# Patient Record
Sex: MALE | Race: WHITE | NOT HISPANIC OR LATINO | Employment: OTHER | URBAN - METROPOLITAN AREA
[De-identification: names, ages, dates, MRNs, and addresses within clinical notes are randomized per-mention and may not be internally consistent; named-entity substitution may affect disease eponyms.]

---

## 2023-03-25 DIAGNOSIS — I10 ESSENTIAL (PRIMARY) HYPERTENSION: ICD-10-CM

## 2023-03-28 RX ORDER — LOSARTAN POTASSIUM 100 MG/1
100 TABLET ORAL DAILY
Qty: 1 TABLET | Refills: 0 | Status: SHIPPED | OUTPATIENT
Start: 2023-03-28 | End: 2023-04-05

## 2023-03-30 DIAGNOSIS — I10 ESSENTIAL (PRIMARY) HYPERTENSION: ICD-10-CM

## 2023-04-05 RX ORDER — LOSARTAN POTASSIUM 100 MG/1
100 TABLET ORAL DAILY
Qty: 30 TABLET | Refills: 0 | Status: SHIPPED | OUTPATIENT
Start: 2023-04-05 | End: 2023-04-06

## 2023-04-06 DIAGNOSIS — I10 ESSENTIAL (PRIMARY) HYPERTENSION: ICD-10-CM

## 2023-04-06 RX ORDER — LOSARTAN POTASSIUM 100 MG/1
100 TABLET ORAL DAILY
Qty: 90 TABLET | Refills: 0 | Status: SHIPPED | OUTPATIENT
Start: 2023-04-06 | End: 2023-04-27 | Stop reason: SDUPTHER

## 2023-04-26 PROBLEM — M25.519 SHOULDER PAIN: Status: ACTIVE | Noted: 2023-04-26

## 2023-04-26 PROBLEM — R10.84 GENERALIZED ABDOMINAL PAIN: Status: ACTIVE | Noted: 2023-04-26

## 2023-04-26 PROBLEM — R10.31 RIGHT GROIN PAIN: Status: ACTIVE | Noted: 2023-04-26

## 2023-04-26 PROBLEM — R93.89 ABNORMAL FINDING ON IMAGING: Status: ACTIVE | Noted: 2023-04-26

## 2023-04-26 PROBLEM — G56.01 CARPAL TUNNEL SYNDROME ON RIGHT: Status: ACTIVE | Noted: 2023-04-26

## 2023-04-26 PROBLEM — R10.11 POSTPRANDIAL RUQ PAIN: Status: ACTIVE | Noted: 2023-04-26

## 2023-04-26 PROBLEM — B96.89 ACUTE BACTERIAL BRONCHITIS: Status: ACTIVE | Noted: 2023-04-26

## 2023-04-26 PROBLEM — M77.9 TENDONITIS: Status: ACTIVE | Noted: 2023-04-26

## 2023-04-26 PROBLEM — M25.571 ACUTE RIGHT ANKLE PAIN: Status: ACTIVE | Noted: 2023-04-26

## 2023-04-26 PROBLEM — M99.02 SEGMENTAL AND SOMATIC DYSFUNCTION OF THORACIC REGION: Status: ACTIVE | Noted: 2023-04-26

## 2023-04-26 PROBLEM — R60.0 SALIVARY GLAND SWELLING: Status: ACTIVE | Noted: 2023-04-26

## 2023-04-26 PROBLEM — I10 HYPERTENSION: Status: ACTIVE | Noted: 2023-04-26

## 2023-04-26 PROBLEM — M99.09 SEGMENTAL AND SOMATIC DYSFUNCTION: Status: ACTIVE | Noted: 2023-04-26

## 2023-04-26 PROBLEM — M54.2 NECK PAIN: Status: ACTIVE | Noted: 2023-04-26

## 2023-04-26 PROBLEM — M48.00 SPINAL STENOSIS, MULTILEVEL: Status: ACTIVE | Noted: 2023-04-26

## 2023-04-26 PROBLEM — K44.9 HIATAL HERNIA: Status: ACTIVE | Noted: 2023-04-26

## 2023-04-26 PROBLEM — R25.2 LEG CRAMPING: Status: ACTIVE | Noted: 2023-04-26

## 2023-04-26 PROBLEM — R19.00 MASS OF ABDOMEN: Status: ACTIVE | Noted: 2023-04-26

## 2023-04-26 PROBLEM — R20.0 NUMBNESS OF RIGHT FOOT: Status: ACTIVE | Noted: 2023-04-26

## 2023-04-26 PROBLEM — G56.02 LEFT CARPAL TUNNEL SYNDROME: Status: ACTIVE | Noted: 2023-04-26

## 2023-04-26 PROBLEM — M54.17 LUMBOSACRAL RADICULOPATHY AT L5: Status: ACTIVE | Noted: 2023-04-26

## 2023-04-26 PROBLEM — J20.8 ACUTE BACTERIAL BRONCHITIS: Status: ACTIVE | Noted: 2023-04-26

## 2023-04-26 PROBLEM — R11.10 GASTRIC REGURGITATION: Status: ACTIVE | Noted: 2023-04-26

## 2023-04-26 PROBLEM — H66.90 OTITIS MEDIA, ACUTE: Status: ACTIVE | Noted: 2023-04-26

## 2023-04-26 PROBLEM — M25.50 JOINT PAIN: Status: ACTIVE | Noted: 2023-04-26

## 2023-04-26 PROBLEM — R14.0 ABDOMINAL BLOATING: Status: ACTIVE | Noted: 2023-04-26

## 2023-04-26 PROBLEM — K59.09 CHRONIC CONSTIPATION: Status: ACTIVE | Noted: 2023-04-26

## 2023-04-26 PROBLEM — M99.03 LUMBOSACRAL DYSFUNCTION: Status: ACTIVE | Noted: 2023-04-26

## 2023-04-26 PROBLEM — M54.6 CHRONIC RIGHT-SIDED THORACIC BACK PAIN: Status: ACTIVE | Noted: 2023-04-26

## 2023-04-26 PROBLEM — M99.04 SACROILIAC JOINT SOMATIC DYSFUNCTION: Status: ACTIVE | Noted: 2023-04-26

## 2023-04-26 PROBLEM — M25.551 BILATERAL HIP PAIN: Status: ACTIVE | Noted: 2023-04-26

## 2023-04-26 PROBLEM — R63.4 WEIGHT LOSS: Status: ACTIVE | Noted: 2023-04-26

## 2023-04-26 PROBLEM — R22.1 NECK SWELLING: Status: ACTIVE | Noted: 2023-04-26

## 2023-04-26 PROBLEM — K76.9 LIVER DISEASE: Status: ACTIVE | Noted: 2023-04-26

## 2023-04-26 PROBLEM — D64.9 ANEMIA: Status: ACTIVE | Noted: 2023-04-26

## 2023-04-26 PROBLEM — K63.5 COLON POLYP: Status: ACTIVE | Noted: 2023-04-26

## 2023-04-26 PROBLEM — G89.29 CHRONIC RIGHT-SIDED THORACIC BACK PAIN: Status: ACTIVE | Noted: 2023-04-26

## 2023-04-26 PROBLEM — M54.40 LUMBAGO WITH SCIATICA: Status: ACTIVE | Noted: 2023-04-26

## 2023-04-26 PROBLEM — R10.9 CHRONIC ABDOMINAL PAIN: Status: ACTIVE | Noted: 2023-04-26

## 2023-04-26 PROBLEM — G62.9 NEUROPATHY: Status: ACTIVE | Noted: 2023-04-26

## 2023-04-26 PROBLEM — M25.552 BILATERAL HIP PAIN: Status: ACTIVE | Noted: 2023-04-26

## 2023-04-26 PROBLEM — K21.9 GASTROESOPHAGEAL REFLUX DISEASE: Status: ACTIVE | Noted: 2023-04-26

## 2023-04-26 PROBLEM — M47.816 LUMBAR SPONDYLOSIS: Status: ACTIVE | Noted: 2023-04-26

## 2023-04-26 PROBLEM — M62.838 MUSCLE SPASM: Status: ACTIVE | Noted: 2023-04-26

## 2023-04-26 PROBLEM — R09.89 THROAT TIGHTNESS: Status: ACTIVE | Noted: 2023-04-26

## 2023-04-26 PROBLEM — M54.16 RIGHT LUMBAR RADICULOPATHY: Status: ACTIVE | Noted: 2023-04-26

## 2023-04-26 PROBLEM — M35.3 PMR (POLYMYALGIA RHEUMATICA) (MULTI): Status: ACTIVE | Noted: 2023-04-26

## 2023-04-26 PROBLEM — G57.10 MERALGIA PARESTHETICA: Status: ACTIVE | Noted: 2023-04-26

## 2023-04-26 PROBLEM — M54.12 RIGHT CERVICAL RADICULOPATHY: Status: ACTIVE | Noted: 2023-04-26

## 2023-04-26 PROBLEM — R17 ELEVATED BILIRUBIN: Status: ACTIVE | Noted: 2023-04-26

## 2023-04-26 PROBLEM — R22.9 SUBCUTANEOUS MASS: Status: ACTIVE | Noted: 2023-04-26

## 2023-04-26 PROBLEM — G89.29 CHRONIC ABDOMINAL PAIN: Status: ACTIVE | Noted: 2023-04-26

## 2023-04-26 PROBLEM — R07.0 THROAT DISCOMFORT: Status: ACTIVE | Noted: 2023-04-26

## 2023-04-26 PROBLEM — M47.812 CERVICAL SPONDYLOSIS WITHOUT MYELOPATHY: Status: ACTIVE | Noted: 2023-04-26

## 2023-04-26 PROBLEM — M48.02 FORAMINAL STENOSIS OF CERVICAL REGION: Status: ACTIVE | Noted: 2023-04-26

## 2023-04-26 PROBLEM — K86.2 PANCREAS CYST (HHS-HCC): Status: ACTIVE | Noted: 2023-04-26

## 2023-04-26 PROBLEM — M54.9 BACK PAIN: Status: ACTIVE | Noted: 2023-04-26
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
## 2023-04-26 PROBLEM — J30.2 SEASONAL ALLERGIES: Status: ACTIVE | Noted: 2023-04-26

## 2023-04-26 PROBLEM — M79.642 HAND PAIN, LEFT: Status: ACTIVE | Noted: 2023-04-26

## 2023-04-26 PROBLEM — M79.651 PAIN OF RIGHT THIGH: Status: ACTIVE | Noted: 2023-04-26

## 2023-04-26 PROBLEM — K22.70 BARRETT ESOPHAGUS: Status: ACTIVE | Noted: 2023-04-26

## 2023-04-26 RX ORDER — METOPROLOL SUCCINATE 50 MG/1
50 TABLET, EXTENDED RELEASE ORAL DAILY
COMMUNITY
End: 2023-04-27 | Stop reason: SDUPTHER

## 2023-04-26 RX ORDER — OMEPRAZOLE 20 MG/1
1 CAPSULE, DELAYED RELEASE ORAL 2 TIMES DAILY
COMMUNITY
Start: 2022-04-20

## 2023-04-26 RX ORDER — LOSARTAN POTASSIUM AND HYDROCHLOROTHIAZIDE 25; 100 MG/1; MG/1
1 TABLET ORAL DAILY
COMMUNITY
End: 2023-04-27

## 2023-04-27 ENCOUNTER — OFFICE VISIT (OUTPATIENT)
Dept: PRIMARY CARE | Facility: CLINIC | Age: 63
End: 2023-04-27
Payer: COMMERCIAL

## 2023-04-27 VITALS — SYSTOLIC BLOOD PRESSURE: 114 MMHG | BODY MASS INDEX: 27.2 KG/M2 | WEIGHT: 195 LBS | DIASTOLIC BLOOD PRESSURE: 80 MMHG

## 2023-04-27 DIAGNOSIS — M35.3 PMR (POLYMYALGIA RHEUMATICA) (MULTI): ICD-10-CM

## 2023-04-27 DIAGNOSIS — I10 ESSENTIAL (PRIMARY) HYPERTENSION: ICD-10-CM

## 2023-04-27 DIAGNOSIS — K86.2 PANCREATIC CYST (HHS-HCC): Primary | ICD-10-CM

## 2023-04-27 DIAGNOSIS — G56.02 LEFT CARPAL TUNNEL SYNDROME: ICD-10-CM

## 2023-04-27 DIAGNOSIS — K22.70 BARRETT'S ESOPHAGUS WITHOUT DYSPLASIA: ICD-10-CM

## 2023-04-27 DIAGNOSIS — K21.9 GASTROESOPHAGEAL REFLUX DISEASE WITHOUT ESOPHAGITIS: ICD-10-CM

## 2023-04-27 PROCEDURE — 1036F TOBACCO NON-USER: CPT | Performed by: STUDENT IN AN ORGANIZED HEALTH CARE EDUCATION/TRAINING PROGRAM

## 2023-04-27 PROCEDURE — 99213 OFFICE O/P EST LOW 20 MIN: CPT | Performed by: STUDENT IN AN ORGANIZED HEALTH CARE EDUCATION/TRAINING PROGRAM

## 2023-04-27 PROCEDURE — 3074F SYST BP LT 130 MM HG: CPT | Performed by: STUDENT IN AN ORGANIZED HEALTH CARE EDUCATION/TRAINING PROGRAM

## 2023-04-27 PROCEDURE — 3079F DIAST BP 80-89 MM HG: CPT | Performed by: STUDENT IN AN ORGANIZED HEALTH CARE EDUCATION/TRAINING PROGRAM

## 2023-04-27 RX ORDER — LOSARTAN POTASSIUM 100 MG/1
100 TABLET ORAL DAILY
Qty: 90 TABLET | Refills: 1 | Status: SHIPPED | OUTPATIENT
Start: 2023-04-27 | End: 2023-12-21

## 2023-04-27 RX ORDER — METOPROLOL SUCCINATE 50 MG/1
50 TABLET, EXTENDED RELEASE ORAL DAILY
Qty: 90 TABLET | Refills: 1 | Status: SHIPPED | OUTPATIENT
Start: 2023-04-27 | End: 2023-12-26

## 2023-04-27 ASSESSMENT — ENCOUNTER SYMPTOMS
CONSTITUTIONAL NEGATIVE: 1
PSYCHIATRIC NEGATIVE: 1
MUSCULOSKELETAL NEGATIVE: 1
GASTROINTESTINAL NEGATIVE: 1
RESPIRATORY NEGATIVE: 1
NEUROLOGICAL NEGATIVE: 1
CARDIOVASCULAR NEGATIVE: 1

## 2023-04-27 NOTE — PROGRESS NOTES
\follow up and med refill      Subjective   Patient ID: Jenaro Belcher is a 62 y.o. male.    Routine follow-up.  Patient doing overall well, is concerned about being on losartan potassium, however review of chart reveals that patient was recently transition from losartan hydrochlorothiazide to this.  He reports no side effects, overall doing well.  Tolerating medications states no additional issues or concerns.        Review of Systems   Constitutional: Negative.    HENT: Negative.     Respiratory: Negative.     Cardiovascular: Negative.    Gastrointestinal: Negative.    Musculoskeletal: Negative.    Skin: Negative.    Neurological: Negative.    Psychiatric/Behavioral: Negative.         Objective     Visit Vitals  /80   Wt 88.5 kg (195 lb)   BMI 27.20 kg/m²   Smoking Status Former   BSA 2.11 m²      Physical Exam  Vitals and nursing note reviewed.   Constitutional:       General: He is not in acute distress.     Appearance: Normal appearance.   HENT:      Mouth/Throat:      Mouth: Mucous membranes are moist.      Pharynx: Oropharynx is clear. No oropharyngeal exudate.   Eyes:      Extraocular Movements: Extraocular movements intact.      Pupils: Pupils are equal, round, and reactive to light.   Cardiovascular:      Rate and Rhythm: Normal rate and regular rhythm.      Pulses: Normal pulses.      Heart sounds: Normal heart sounds. No murmur heard.  Pulmonary:      Effort: Pulmonary effort is normal. No respiratory distress.   Abdominal:      General: Abdomen is flat. Bowel sounds are normal. There is no distension.      Tenderness: There is no abdominal tenderness.   Musculoskeletal:         General: Normal range of motion.      Right lower leg: No edema.      Left lower leg: No edema.   Skin:     General: Skin is warm and dry.      Capillary Refill: Capillary refill takes less than 2 seconds.   Neurological:      General: No focal deficit present.      Mental Status: He is alert. Mental status is at baseline.       Cranial Nerves: No cranial nerve deficit.      Motor: No weakness.   Psychiatric:         Mood and Affect: Mood normal.         Thought Content: Thought content normal.         Assessment/Plan   Diagnoses and all orders for this visit:  Essential (primary) hypertension  -     losartan (Cozaar) 100 mg tablet; Take 1 tablet (100 mg) by mouth once daily.  -     metoprolol succinate XL (Toprol-XL) 50 mg 24 hr tablet; Take 1 tablet (50 mg) by mouth once daily.      Patient presents for routine follow-up and other complaints     #Upper respiratory infection  #Otitis media  Stable at this time, it does still have some earwax, advised Debrox as needed     #Leg cramps  #Hypertension  Leg cramps have completely resolved after switching off of losartan hydrochlorothiazide, continue losartan, metoprolol, labs at appropriate time     #Pickett's esophagus  Continue PPI twice daily    #Back pain  #Degenerative disc disease  Has followed with physical therapy with regards to this, did see neurosurgery with regards to this, he was cleared for therapy, monitor     #Bilateral carpal tunnel syndrome  Follows with orthopedic underwent surgical fixation, still having issues with the right side monitor     #abdominal cramping  #pancreatic cysts  Follows with GI as well as genetics due to family history of pancreatic cancer, follow-up on routine testing, overall stable has a planned MRI in the future    #PMR  Follows with rheumatology     #Health maintenance  Routine labs next visit      Recently had colonoscopy overall stable, advised age-appropriate vaccinations     Return to care in 6 months or prn     This note was dictated by speech recognition. Minor errors in transcription may be present.

## 2023-10-11 ENCOUNTER — HOSPITAL ENCOUNTER (OUTPATIENT)
Dept: RADIOLOGY | Facility: HOSPITAL | Age: 63
Discharge: HOME | End: 2023-10-11
Payer: COMMERCIAL

## 2023-10-11 DIAGNOSIS — K86.2 CYST OF PANCREAS (HHS-HCC): ICD-10-CM

## 2023-10-11 PROCEDURE — 74183 MRI ABD W/O CNTR FLWD CNTR: CPT

## 2023-10-11 PROCEDURE — A9575 INJ GADOTERATE MEGLUMI 0.1ML: HCPCS | Performed by: INTERNAL MEDICINE

## 2023-10-11 PROCEDURE — 2550000001 HC RX 255 CONTRASTS: Performed by: INTERNAL MEDICINE

## 2023-10-11 PROCEDURE — 76376 3D RENDER W/INTRP POSTPROCES: CPT | Performed by: RADIOLOGY

## 2023-10-11 PROCEDURE — 74183 MRI ABD W/O CNTR FLWD CNTR: CPT | Performed by: RADIOLOGY

## 2023-10-11 RX ORDER — GADOTERATE MEGLUMINE 376.9 MG/ML
17 INJECTION INTRAVENOUS
Status: COMPLETED | OUTPATIENT
Start: 2023-10-11 | End: 2023-10-11

## 2023-10-11 RX ADMIN — GADOTERATE MEGLUMINE 17 ML: 376.9 INJECTION INTRAVENOUS at 18:48

## 2023-10-19 ENCOUNTER — OFFICE VISIT (OUTPATIENT)
Dept: PRIMARY CARE | Facility: CLINIC | Age: 63
End: 2023-10-19
Payer: COMMERCIAL

## 2023-10-19 VITALS
DIASTOLIC BLOOD PRESSURE: 88 MMHG | SYSTOLIC BLOOD PRESSURE: 138 MMHG | HEIGHT: 70 IN | WEIGHT: 195 LBS | HEART RATE: 58 BPM | OXYGEN SATURATION: 98 % | BODY MASS INDEX: 27.92 KG/M2

## 2023-10-19 DIAGNOSIS — L23.7 POISON IVY: ICD-10-CM

## 2023-10-19 DIAGNOSIS — M35.3 PMR (POLYMYALGIA RHEUMATICA) (MULTI): ICD-10-CM

## 2023-10-19 DIAGNOSIS — Z00.00 HEALTHCARE MAINTENANCE: Primary | ICD-10-CM

## 2023-10-19 PROCEDURE — 3079F DIAST BP 80-89 MM HG: CPT | Performed by: INTERNAL MEDICINE

## 2023-10-19 PROCEDURE — 1036F TOBACCO NON-USER: CPT | Performed by: INTERNAL MEDICINE

## 2023-10-19 PROCEDURE — 99214 OFFICE O/P EST MOD 30 MIN: CPT | Performed by: INTERNAL MEDICINE

## 2023-10-19 PROCEDURE — 3075F SYST BP GE 130 - 139MM HG: CPT | Performed by: INTERNAL MEDICINE

## 2023-10-19 RX ORDER — METHYLPREDNISOLONE 4 MG/1
TABLET ORAL
Qty: 21 TABLET | Refills: 2 | Status: SHIPPED | OUTPATIENT
Start: 2023-10-19 | End: 2023-10-26

## 2023-10-19 RX ORDER — BETAMETHASONE DIPROPIONATE 0.5 MG/G
LOTION TOPICAL 2 TIMES DAILY PRN
Qty: 60 ML | Refills: 2 | Status: SHIPPED | OUTPATIENT
Start: 2023-10-19 | End: 2024-10-18

## 2023-10-19 NOTE — PROGRESS NOTES
"Subjective   Patient ID: Jenaro Belcher is a 63 y.o. male who presents for Poison Ivy (Both arm and ear lobes).  HPI        Past medical history chronic lumbar back pain lumbar radiculopathy PM&R hypertension    Main issue is a skin rash that is red that started over the last week or so on his arms on his ear grade 7 out of 10 its worse after he is out in the yard cleaning some bushes he is concerned about poison ivy he has had a before  Very pruritic  Insomnia from the itching  Denies fever chills pain chest pain dyspnea wheezing tongue pain swelling tongue swelling        Health Maintenance:      Colonoscopy: 2022      Mammogram:      Pelvic/Pap:      Low dose chest CT:      Aorta duplex:      Optho:      Podiatry:        Vaccines:      Prevnar 20:      Prevnar 13:      Pneumovax 23:      Tdap:      Shingrix:      COVID:      Influenza:        ROS:      General: denies fever/chills/weight loss      Head: denies HA/trauma/masses/dizziness      Eyes: denies vision change/loss of vision/blurry vision/diplopia/eye pain      Ears: denies hearing loss/tinnitus/otalgia/otorrhea      Nose: denies nasal drainage/anosmia      Throat: denies dysphagia/odynophagia      Lymphatics: denies lymph node swelling      Cardiac: denies CP/palpitations/orthopnea/PND      Pulmonary: denies dyspnea/cough/wheezing      GI: denies abd pain/n/v/diarrhea/melena/hematochezia/hematemesis      : denies dysuria/hematuria/change frequency      Genital: denies genital discharge/lesions      Skin: Severe skin rash very pruritic arms face denies rashes/lesions/masses      MSK: denies weakness/swelling/edema/gait imbalance/pain      Neuro: denies paresthesias/seizures/dysarthria      Psych: denies depression/anxiety/suicidal or homicidal ideations            Objective   BP (!) 150/98   Ht 1.778 m (5' 10\")   Wt 88.5 kg (195 lb)   BMI 27.98 kg/m²      Physical Exam:     General: AO3, NAD     Head: atraumatic/NC     Eyes: EOMI/PERRLA. Negative " "APD     Ears: TM pearly gray, EAC clear. No lesions or erythema     Nose: symmetric nares, no discharge     Throat: trachea midline, uvula midline pink mucosa. No thyromegaly     Lymphatics: no cervical/supraclavicular/ant or posterior cervical adenopathy/axillary/inguinal adenopathy     Breast: not examined     Chest: no deformity or tenderness to palpation     Pulm: CTA b/l, no wheeze/rhonchi/rales. nonlabored     Cardiac: RRR +s1s2, no m/r/g.      GI: soft, NT/ND. Normoactive Bsx4. No rebound/guarding.     Rectal: no examined     MSK: 5/5 strength UE LE. No edema/clubbing/cyanosis     Skin: Erythema and dry microvesicles noted flexor forearm erythema dryness outer ear no rashes/lesions     Vascular: 2+ palp DP PT radials b/l. Negative carotid bruit     Neuro: CNII-XII intact. No focal deficits. Reflexes 2/4 brachioradialis bicep tricep patellar achilles. Finger to nose intact.     Psych: appropriate mood/affect                    No results found for: \"BMPR1A\", \"CBCDIF\"      Assessment/Plan   Diagnoses and all orders for this visit:  Healthcare maintenance  -     CBC and Auto Differential; Future  -     Basic Metabolic Panel; Future  -     Hemoglobin A1C; Future  -     Lipid panel; Future  -     T4, free; Future  -     TSH; Future  -     Prostate Spec.Ag,Screen; Future  Poison ivy  -     methylPREDNISolone (Medrol Dospak) 4 mg tablets; Take as directed on package.  -     betamethasone dipropionate (Diprosone) 0.05 % lotion; Apply topically 2 times a day as needed for irritation or rash.  PMR (polymyalgia rheumatica) (CMS/HCC)    Go to the ER for any new or worsening symptoms such as spreading rash    Thank you for making appointment today Darren    Please follow-up in 6 months       Carlee Bellamy MA  "

## 2023-11-20 ENCOUNTER — APPOINTMENT (OUTPATIENT)
Dept: PRIMARY CARE | Facility: CLINIC | Age: 63
End: 2023-11-20
Payer: COMMERCIAL

## 2023-12-11 ENCOUNTER — OFFICE VISIT (OUTPATIENT)
Dept: GASTROENTEROLOGY | Facility: CLINIC | Age: 63
End: 2023-12-11
Payer: COMMERCIAL

## 2023-12-11 VITALS — HEART RATE: 67 BPM | BODY MASS INDEX: 30.21 KG/M2 | HEIGHT: 69 IN | WEIGHT: 204 LBS

## 2023-12-11 DIAGNOSIS — R12 FUNCTIONAL HEARTBURN: Primary | ICD-10-CM

## 2023-12-11 DIAGNOSIS — R11.0 CHRONIC NAUSEA: ICD-10-CM

## 2023-12-11 DIAGNOSIS — K44.9 HIATAL HERNIA: ICD-10-CM

## 2023-12-11 DIAGNOSIS — K21.9 GASTROESOPHAGEAL REFLUX DISEASE WITHOUT ESOPHAGITIS: ICD-10-CM

## 2023-12-11 PROCEDURE — 1036F TOBACCO NON-USER: CPT | Performed by: INTERNAL MEDICINE

## 2023-12-11 PROCEDURE — 99214 OFFICE O/P EST MOD 30 MIN: CPT | Performed by: INTERNAL MEDICINE

## 2023-12-11 RX ORDER — DESIPRAMINE HYDROCHLORIDE 10 MG/1
10 TABLET ORAL NIGHTLY
Qty: 30 TABLET | Refills: 1 | Status: SHIPPED | OUTPATIENT
Start: 2023-12-11 | End: 2024-01-04 | Stop reason: SDUPTHER

## 2023-12-11 RX ORDER — HYDROGEN PEROXIDE 3 %
20 SOLUTION, NON-ORAL MISCELLANEOUS
COMMUNITY

## 2023-12-11 NOTE — PROGRESS NOTES
REASON FOR VISIT: To discuss reflux, nausea    HPI:  Jenaro Belcher is a 63 y.o. male with a past medical history of hypertension and Pickett's esophagus and chronic GERD being evaluated in the office for persistent symptoms of GERD.  Has followed with Dr. Joshi in the past chronically.  Discussing possible Linx procedure for his chronic GERD symptoms.  He describes partial response to acid suppressive therapy even on high doses with the use of omeprazole 40 mg as well as esomeprazole in the past.  No dysphagia or dyne aphasia.  Most recent upper endoscopy done about 15 months ago with short segment Pickett's esophagus.  No reflux esophagitis otherwise.  Has 2 cm hiatal hernia.  He describes episodes every few weeks where he has persistent discomfort in the chest with tingling sensation that also goes out to his arms and legs.  This is not positional when this happens and can last many hours or several days.          PRIOR ENDOSCOPY  See Procedures    PAST MEDICAL HISTORY  Past Medical History:   Diagnosis Date    Personal history of other diseases of the circulatory system     History of hypertension       PAST SURGICAL HISTORY  Past Surgical History:   Procedure Laterality Date    COLONOSCOPY  02/19/2014    Complete Colonoscopy    ESOPHAGOGASTRODUODENOSCOPY  02/19/2014    Diagnostic Esophagogastroduodenoscopy    OTHER SURGICAL HISTORY  01/06/2023    Shoulder surgery    OTHER SURGICAL HISTORY  11/12/2019    Knee surgery       FAMILY HISTORY  No family history on file.    SOCIAL HISTORY  Social History     Tobacco Use    Smoking status: Former     Types: Cigarettes    Smokeless tobacco: Former   Substance Use Topics    Alcohol use: Not on file       REVIEW OF SYSTEMS  CONSTITUTIONAL: negative for fever, chills, fatigue, or unintentional weight loss,   HEENT: negative for icteric sclera, eye pain/redness, or changes in vision/hearing  RESPIRATORY: negative for cough, hemoptysis, wheezing, orthopnea, or dyspnea on  "exertion  CARDIOVASCULAR: negative for chest pain, palpitations, or syncope   GASTROINTESTINAL: as noted per HPI  GENITOURINARY: negative for dysuria, polyuria, incontinence, or hematuria  MUSCULOSKELETAL: negative for arthralgia, myalgia, or joint swelling/stiffness   INTEGUMENTARY/SKIN: negative jaundice, rash, or skin lesion  HEMATOLOGIC/LYMPHATIC: negative for prolonged bleeding, easy bruising, or swollen lymph nodes  ENDOCRINE: negative for cold/heat intolerance, polydipsia, polyuria, or goiter  NEUROLOGIC: negative for headaches, dizziness, tremor, or gait abnormality  PSYCHIATRIC: negative for anxiety, depression, personality changes, or sleep disturbances      A 10 point review of systems was completed and was otherwise negative.    ALLERGIES  Allergies   Allergen Reactions    Nitrofurantoin Macrocrystalline Hives       MEDICATIONS  Current Outpatient Medications   Medication Sig Dispense Refill    esomeprazole (NexIUM) 20 mg DR capsule Take 1 capsule (20 mg) by mouth once daily in the morning. Take before meals. Do not open capsule.      betamethasone dipropionate (Diprosone) 0.05 % lotion Apply topically 2 times a day as needed for irritation or rash. (Patient not taking: Reported on 12/11/2023) 60 mL 2    desipramine (Norpramin) 10 mg tablet Take 1 tablet (10 mg) by mouth once daily at bedtime. 30 tablet 1    losartan (Cozaar) 100 mg tablet Take 1 tablet (100 mg) by mouth once daily. 90 tablet 1    metoprolol succinate XL (Toprol-XL) 50 mg 24 hr tablet Take 1 tablet (50 mg) by mouth once daily. 90 tablet 1    omeprazole (PriLOSEC) 20 mg DR capsule Take 1 capsule (20 mg) by mouth 2 times a day.       No current facility-administered medications for this visit.       VITALS  Pulse 67   Ht 1.753 m (5' 9\")   Wt 92.5 kg (204 lb)   BMI 30.13 kg/m²      PHYSICAL EXAM  Alert and oriented in no acute distress      ASSESSMENT/ PLAN  Patient with chronic GERD as well as small hiatal hernia and short segment " nondysplastic Pickett's esophagus.  Having episodes that do not respond to acid suppressive therapy with discomfort in the chest with burning and tingling sensation.  I suspect element of functional heartburn and visceral hypersensitivity.  I did recommend he continue acid suppressive therapy with his Pickett's history.  I did suggest trial of low-dose desipramine for these episodes to see if that helps control these and his chronic nausea.  He would like to give it a try.  He will consider seeing surgeons to discuss Lynx if he does not have response with this approach.        Signature: Pat Ribeiro MD    Date: 12/11/2023  Time: 3:12 PM

## 2023-12-19 ENCOUNTER — ALLIED HEALTH (OUTPATIENT)
Dept: INTEGRATIVE MEDICINE | Facility: CLINIC | Age: 63
End: 2023-12-19
Payer: COMMERCIAL

## 2023-12-19 DIAGNOSIS — M99.01 SOMATIC DYSFUNCTION OF CERVICAL REGION: ICD-10-CM

## 2023-12-19 DIAGNOSIS — M99.03 SOMATIC DYSFUNCTION OF LUMBAR REGION: Primary | ICD-10-CM

## 2023-12-19 DIAGNOSIS — M54.17 LUMBOSACRAL RADICULOPATHY AT L5: ICD-10-CM

## 2023-12-19 DIAGNOSIS — M79.10 MYALGIA, UNSPECIFIED SITE: ICD-10-CM

## 2023-12-19 DIAGNOSIS — M99.02 SOMATIC DYSFUNCTION OF THORACIC REGION: ICD-10-CM

## 2023-12-19 DIAGNOSIS — M99.04 SACROILIAC JOINT SOMATIC DYSFUNCTION: ICD-10-CM

## 2023-12-19 PROCEDURE — 97140 MANUAL THERAPY 1/> REGIONS: CPT | Performed by: CHIROPRACTOR

## 2023-12-19 PROCEDURE — 98941 CHIROPRACT MANJ 3-4 REGIONS: CPT | Performed by: CHIROPRACTOR

## 2023-12-19 ASSESSMENT — ENCOUNTER SYMPTOMS
FEVER: 0
CONFUSION: 0
ABDOMINAL PAIN: 0
FREQUENCY: 0
JOINT SWELLING: 0
FATIGUE: 0
TROUBLE SWALLOWING: 0
CHEST TIGHTNESS: 0
CONSTIPATION: 0
DIARRHEA: 0
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
## 2023-12-21 DIAGNOSIS — I10 ESSENTIAL (PRIMARY) HYPERTENSION: ICD-10-CM

## 2023-12-21 RX ORDER — LOSARTAN POTASSIUM 100 MG/1
100 TABLET ORAL DAILY
Qty: 90 TABLET | Refills: 0 | Status: SHIPPED | OUTPATIENT
Start: 2023-12-21 | End: 2024-04-15 | Stop reason: SDUPTHER

## 2024-01-04 DIAGNOSIS — R12 FUNCTIONAL HEARTBURN: ICD-10-CM

## 2024-01-04 RX ORDER — DESIPRAMINE HYDROCHLORIDE 10 MG/1
10 TABLET ORAL NIGHTLY
Qty: 90 TABLET | Refills: 0 | Status: SHIPPED | OUTPATIENT
Start: 2024-01-04

## 2024-01-16 ENCOUNTER — ALLIED HEALTH (OUTPATIENT)
Dept: INTEGRATIVE MEDICINE | Facility: CLINIC | Age: 64
End: 2024-01-16
Payer: COMMERCIAL

## 2024-01-16 DIAGNOSIS — M99.03 SOMATIC DYSFUNCTION OF LUMBAR REGION: Primary | ICD-10-CM

## 2024-01-16 DIAGNOSIS — M79.10 MYALGIA, UNSPECIFIED SITE: ICD-10-CM

## 2024-01-16 DIAGNOSIS — M54.17 LUMBOSACRAL RADICULOPATHY AT L5: ICD-10-CM

## 2024-01-16 DIAGNOSIS — M25.551 PAIN IN RIGHT HIP: ICD-10-CM

## 2024-01-16 DIAGNOSIS — M99.01 SOMATIC DYSFUNCTION OF CERVICAL REGION: ICD-10-CM

## 2024-01-16 DIAGNOSIS — M99.04 SACROILIAC JOINT SOMATIC DYSFUNCTION: ICD-10-CM

## 2024-01-16 DIAGNOSIS — M99.02 SOMATIC DYSFUNCTION OF THORACIC REGION: ICD-10-CM

## 2024-01-16 PROCEDURE — 97112 NEUROMUSCULAR REEDUCATION: CPT | Performed by: CHIROPRACTOR

## 2024-01-16 PROCEDURE — 98941 CHIROPRACT MANJ 3-4 REGIONS: CPT | Performed by: CHIROPRACTOR

## 2024-01-16 ASSESSMENT — ENCOUNTER SYMPTOMS
DIARRHEA: 0
TROUBLE SWALLOWING: 0
JOINT SWELLING: 0
FATIGUE: 0
CONSTIPATION: 0
CHEST TIGHTNESS: 0
CONFUSION: 0
FREQUENCY: 0
ABDOMINAL PAIN: 0
FEVER: 0
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
## 2024-01-30 ENCOUNTER — ALLIED HEALTH (OUTPATIENT)
Dept: INTEGRATIVE MEDICINE | Facility: CLINIC | Age: 64
End: 2024-01-30
Payer: COMMERCIAL

## 2024-01-30 DIAGNOSIS — M54.2 CERVICALGIA: ICD-10-CM

## 2024-01-30 DIAGNOSIS — M79.10 MYALGIA, UNSPECIFIED SITE: ICD-10-CM

## 2024-01-30 DIAGNOSIS — M99.02 SOMATIC DYSFUNCTION OF THORACIC REGION: ICD-10-CM

## 2024-01-30 DIAGNOSIS — M99.04 SACROILIAC JOINT SOMATIC DYSFUNCTION: ICD-10-CM

## 2024-01-30 DIAGNOSIS — M54.17 LUMBOSACRAL RADICULOPATHY AT L5: ICD-10-CM

## 2024-01-30 DIAGNOSIS — M99.01 SOMATIC DYSFUNCTION OF CERVICAL REGION: ICD-10-CM

## 2024-01-30 DIAGNOSIS — M99.03 SOMATIC DYSFUNCTION OF LUMBAR REGION: Primary | ICD-10-CM

## 2024-01-30 DIAGNOSIS — M25.551 PAIN IN RIGHT HIP: ICD-10-CM

## 2024-01-30 PROCEDURE — 98941 CHIROPRACT MANJ 3-4 REGIONS: CPT | Performed by: CHIROPRACTOR

## 2024-01-30 PROCEDURE — 97112 NEUROMUSCULAR REEDUCATION: CPT | Performed by: CHIROPRACTOR

## 2024-01-30 ASSESSMENT — ENCOUNTER SYMPTOMS
TROUBLE SWALLOWING: 0
CONFUSION: 0
ABDOMINAL PAIN: 0
FEVER: 0
DIARRHEA: 0
JOINT SWELLING: 0
FREQUENCY: 0
FATIGUE: 0
CHEST TIGHTNESS: 0
CONSTIPATION: 0
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
## 2024-03-12 ENCOUNTER — ALLIED HEALTH (OUTPATIENT)
Dept: INTEGRATIVE MEDICINE | Facility: CLINIC | Age: 64
End: 2024-03-12
Payer: COMMERCIAL

## 2024-03-12 DIAGNOSIS — M99.04 SACROILIAC JOINT SOMATIC DYSFUNCTION: ICD-10-CM

## 2024-03-12 DIAGNOSIS — M25.551 PAIN IN RIGHT HIP: ICD-10-CM

## 2024-03-12 DIAGNOSIS — M54.17 LUMBOSACRAL RADICULOPATHY AT L5: ICD-10-CM

## 2024-03-12 DIAGNOSIS — M99.02 SOMATIC DYSFUNCTION OF THORACIC REGION: ICD-10-CM

## 2024-03-12 DIAGNOSIS — M99.03 SOMATIC DYSFUNCTION OF LUMBAR REGION: Primary | ICD-10-CM

## 2024-03-12 DIAGNOSIS — M79.10 MYALGIA, UNSPECIFIED SITE: ICD-10-CM

## 2024-03-12 DIAGNOSIS — M99.01 SOMATIC DYSFUNCTION OF CERVICAL REGION: ICD-10-CM

## 2024-03-12 DIAGNOSIS — M54.2 CERVICALGIA: ICD-10-CM

## 2024-03-12 PROCEDURE — 97112 NEUROMUSCULAR REEDUCATION: CPT | Performed by: CHIROPRACTOR

## 2024-03-12 PROCEDURE — 98941 CHIROPRACT MANJ 3-4 REGIONS: CPT | Performed by: CHIROPRACTOR

## 2024-03-12 ASSESSMENT — ENCOUNTER SYMPTOMS
TROUBLE SWALLOWING: 0
DIARRHEA: 0
FREQUENCY: 0
CONSTIPATION: 0
ABDOMINAL PAIN: 0
CONFUSION: 0
FEVER: 0
JOINT SWELLING: 0
FATIGUE: 0
CHEST TIGHTNESS: 0
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
## 2024-04-15 DIAGNOSIS — I10 ESSENTIAL (PRIMARY) HYPERTENSION: ICD-10-CM

## 2024-04-15 RX ORDER — METOPROLOL SUCCINATE 50 MG/1
50 TABLET, EXTENDED RELEASE ORAL DAILY
Qty: 30 TABLET | Refills: 0 | Status: SHIPPED | OUTPATIENT
Start: 2024-04-15 | End: 2024-05-21 | Stop reason: SDUPTHER

## 2024-04-15 RX ORDER — LOSARTAN POTASSIUM 100 MG/1
100 TABLET ORAL DAILY
Qty: 30 TABLET | Refills: 0 | Status: SHIPPED | OUTPATIENT
Start: 2024-04-15 | End: 2024-05-21 | Stop reason: SDUPTHER

## 2024-04-23 ENCOUNTER — ALLIED HEALTH (OUTPATIENT)
Dept: INTEGRATIVE MEDICINE | Facility: CLINIC | Age: 64
End: 2024-04-23
Payer: COMMERCIAL

## 2024-04-23 DIAGNOSIS — M99.04 SACROILIAC JOINT SOMATIC DYSFUNCTION: ICD-10-CM

## 2024-04-23 DIAGNOSIS — M99.03 SOMATIC DYSFUNCTION OF LUMBAR REGION: Primary | ICD-10-CM

## 2024-04-23 DIAGNOSIS — M54.17 LUMBOSACRAL RADICULOPATHY AT L5: ICD-10-CM

## 2024-04-23 DIAGNOSIS — M79.10 MYALGIA, UNSPECIFIED SITE: ICD-10-CM

## 2024-04-23 DIAGNOSIS — M99.01 SOMATIC DYSFUNCTION OF CERVICAL REGION: ICD-10-CM

## 2024-04-23 DIAGNOSIS — M54.2 CERVICALGIA: ICD-10-CM

## 2024-04-23 DIAGNOSIS — M99.02 SOMATIC DYSFUNCTION OF THORACIC REGION: ICD-10-CM

## 2024-04-23 DIAGNOSIS — M25.551 PAIN IN RIGHT HIP: ICD-10-CM

## 2024-04-23 PROCEDURE — 98941 CHIROPRACT MANJ 3-4 REGIONS: CPT | Performed by: CHIROPRACTOR

## 2024-04-23 PROCEDURE — 97112 NEUROMUSCULAR REEDUCATION: CPT | Performed by: CHIROPRACTOR

## 2024-04-23 ASSESSMENT — ENCOUNTER SYMPTOMS
DIARRHEA: 0
CONSTIPATION: 0
CONFUSION: 0
FATIGUE: 0
FREQUENCY: 0
TROUBLE SWALLOWING: 0
ABDOMINAL PAIN: 0
JOINT SWELLING: 0
CHEST TIGHTNESS: 0
FEVER: 0
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
## 2024-04-25 ENCOUNTER — HOSPITAL ENCOUNTER (OUTPATIENT)
Dept: RADIOLOGY | Facility: CLINIC | Age: 64
Discharge: HOME | End: 2024-04-25
Payer: COMMERCIAL

## 2024-04-25 DIAGNOSIS — M54.17 LUMBOSACRAL RADICULOPATHY AT L5: ICD-10-CM

## 2024-04-25 PROCEDURE — 72110 X-RAY EXAM L-2 SPINE 4/>VWS: CPT

## 2024-04-25 PROCEDURE — 72110 X-RAY EXAM L-2 SPINE 4/>VWS: CPT | Performed by: RADIOLOGY
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
## 2024-05-02 ENCOUNTER — ALLIED HEALTH (OUTPATIENT)
Dept: INTEGRATIVE MEDICINE | Facility: CLINIC | Age: 64
End: 2024-05-02
Payer: COMMERCIAL

## 2024-05-02 DIAGNOSIS — M99.02 SOMATIC DYSFUNCTION OF THORACIC REGION: ICD-10-CM

## 2024-05-02 DIAGNOSIS — M99.03 SOMATIC DYSFUNCTION OF LUMBAR REGION: ICD-10-CM

## 2024-05-02 DIAGNOSIS — M54.17 LUMBOSACRAL RADICULOPATHY AT L5: Primary | ICD-10-CM

## 2024-05-02 DIAGNOSIS — M99.04 SACROILIAC JOINT SOMATIC DYSFUNCTION: ICD-10-CM

## 2024-05-02 PROCEDURE — 98941 CHIROPRACT MANJ 3-4 REGIONS: CPT | Performed by: CHIROPRACTOR

## 2024-05-02 PROCEDURE — 97140 MANUAL THERAPY 1/> REGIONS: CPT | Performed by: CHIROPRACTOR

## 2024-05-02 ASSESSMENT — ENCOUNTER SYMPTOMS
FREQUENCY: 0
FATIGUE: 0
FEVER: 0
TROUBLE SWALLOWING: 0
CHEST TIGHTNESS: 0
CONSTIPATION: 0
DIARRHEA: 0
CONFUSION: 0
JOINT SWELLING: 0
ABDOMINAL PAIN: 0

## 2024-05-21 ENCOUNTER — OFFICE VISIT (OUTPATIENT)
Dept: PRIMARY CARE | Facility: CLINIC | Age: 64
End: 2024-05-21
Payer: COMMERCIAL

## 2024-05-21 VITALS — DIASTOLIC BLOOD PRESSURE: 90 MMHG | BODY MASS INDEX: 28.65 KG/M2 | SYSTOLIC BLOOD PRESSURE: 138 MMHG | WEIGHT: 194 LBS

## 2024-05-21 DIAGNOSIS — I10 ESSENTIAL (PRIMARY) HYPERTENSION: ICD-10-CM

## 2024-05-21 DIAGNOSIS — Z12.5 PROSTATE CANCER SCREENING: ICD-10-CM

## 2024-05-21 DIAGNOSIS — K21.9 GASTROESOPHAGEAL REFLUX DISEASE WITHOUT ESOPHAGITIS: Primary | ICD-10-CM

## 2024-05-21 DIAGNOSIS — Z77.090 ASBESTOS EXPOSURE: ICD-10-CM

## 2024-05-21 DIAGNOSIS — R05.9 COUGH, UNSPECIFIED TYPE: ICD-10-CM

## 2024-05-21 PROBLEM — M35.3 PMR (POLYMYALGIA RHEUMATICA) (MULTI): Status: RESOLVED | Noted: 2023-04-26 | Resolved: 2024-05-21

## 2024-05-21 PROCEDURE — 3075F SYST BP GE 130 - 139MM HG: CPT | Performed by: STUDENT IN AN ORGANIZED HEALTH CARE EDUCATION/TRAINING PROGRAM

## 2024-05-21 PROCEDURE — 3080F DIAST BP >= 90 MM HG: CPT | Performed by: STUDENT IN AN ORGANIZED HEALTH CARE EDUCATION/TRAINING PROGRAM

## 2024-05-21 PROCEDURE — 1036F TOBACCO NON-USER: CPT | Performed by: STUDENT IN AN ORGANIZED HEALTH CARE EDUCATION/TRAINING PROGRAM

## 2024-05-21 PROCEDURE — 99214 OFFICE O/P EST MOD 30 MIN: CPT | Performed by: STUDENT IN AN ORGANIZED HEALTH CARE EDUCATION/TRAINING PROGRAM

## 2024-05-21 RX ORDER — METOPROLOL SUCCINATE 50 MG/1
50 TABLET, EXTENDED RELEASE ORAL DAILY
Qty: 90 TABLET | Refills: 1 | Status: SHIPPED | OUTPATIENT
Start: 2024-05-21

## 2024-05-21 RX ORDER — ESOMEPRAZOLE MAGNESIUM 20 MG/1
20 TABLET, DELAYED RELEASE ORAL DAILY
Qty: 90 TABLET | Refills: 1 | Status: SHIPPED | OUTPATIENT
Start: 2024-05-21 | End: 2024-11-17

## 2024-05-21 RX ORDER — HYDROGEN PEROXIDE 3 %
20 SOLUTION, NON-ORAL MISCELLANEOUS
Qty: 90 CAPSULE | Refills: 1 | Status: CANCELLED | OUTPATIENT
Start: 2024-05-21

## 2024-05-21 RX ORDER — LOSARTAN POTASSIUM 100 MG/1
100 TABLET ORAL DAILY
Qty: 90 TABLET | Refills: 1 | Status: SHIPPED | OUTPATIENT
Start: 2024-05-21

## 2024-05-21 ASSESSMENT — ENCOUNTER SYMPTOMS
SHORTNESS OF BREATH: 0
ACTIVITY CHANGE: 0
CONSTITUTIONAL NEGATIVE: 1
ABDOMINAL PAIN: 0
NEUROLOGICAL NEGATIVE: 1
CHEST TIGHTNESS: 0
GASTROINTESTINAL NEGATIVE: 1
PALPITATIONS: 0
DIARRHEA: 0
NAUSEA: 0
RESPIRATORY NEGATIVE: 1
ARTHRALGIAS: 0
FEVER: 0
ABDOMINAL DISTENTION: 0
CARDIOVASCULAR NEGATIVE: 1
MUSCULOSKELETAL NEGATIVE: 1
AGITATION: 0
DIZZINESS: 0
PSYCHIATRIC NEGATIVE: 1

## 2024-05-21 ASSESSMENT — PATIENT HEALTH QUESTIONNAIRE - PHQ9
SUM OF ALL RESPONSES TO PHQ9 QUESTIONS 1 AND 2: 0
2. FEELING DOWN, DEPRESSED OR HOPELESS: NOT AT ALL
1. LITTLE INTEREST OR PLEASURE IN DOING THINGS: NOT AT ALL

## 2024-05-21 NOTE — PROGRESS NOTES
Follow up and med refill    Subjective   Patient ID: Jenaro Belcher is a 63 y.o. male.    Patient seen on follow-up.  States he is overall doing well, tolerating his medications.  Intermittently, does get some cough and congestion.  States that he was exposed to asbestos, previously in his job.  Otherwise, regards that he is tolerating his medications.  Is planning on getting orthopedic surgery for his knees.  Denies any additional issues at this time    Med Refill  Pertinent negatives include no abdominal pain, arthralgias, chest pain, fever or nausea.       Review of Systems   Constitutional: Negative.  Negative for activity change and fever.   HENT: Negative.     Respiratory: Negative.  Negative for chest tightness and shortness of breath.    Cardiovascular: Negative.  Negative for chest pain, palpitations and leg swelling.   Gastrointestinal: Negative.  Negative for abdominal distention, abdominal pain, diarrhea and nausea.   Musculoskeletal: Negative.  Negative for arthralgias.   Skin: Negative.    Neurological: Negative.  Negative for dizziness and syncope.   Psychiatric/Behavioral: Negative.  Negative for agitation and behavioral problems.        Objective Visit Vitals  /90   Wt 88 kg (194 lb)   BMI 28.65 kg/m²   Smoking Status Former   BSA 2.07 m²      Physical Exam  Constitutional:       General: He is not in acute distress.     Appearance: He is not ill-appearing.   Eyes:      Pupils: Pupils are equal, round, and reactive to light.   Cardiovascular:      Rate and Rhythm: Normal rate and regular rhythm.      Pulses: Normal pulses.      Heart sounds: No murmur heard.  Pulmonary:      Effort: No respiratory distress.      Breath sounds: No wheezing.   Abdominal:      General: Abdomen is flat. Bowel sounds are normal. There is no distension.   Musculoskeletal:      Right lower leg: No edema.      Left lower leg: No edema.   Skin:     General: Skin is warm and dry.   Neurological:      Mental Status: He is  alert. Mental status is at baseline.      Cranial Nerves: No cranial nerve deficit.      Motor: No weakness.   Psychiatric:         Mood and Affect: Mood normal.         Behavior: Behavior normal.         Assessment/Plan   Diagnoses and all orders for this visit:  Gastroesophageal reflux disease without esophagitis  -     esomeprazole magnesium (NexIUM 24HR) 20 mg tablet,delayed release (DR/EC); Take 20 mg by mouth once daily.  -     Lipid panel; Future  -     TSH with reflex to Free T4 if abnormal; Future  -     Hemoglobin A1C; Future  Essential (primary) hypertension  -     losartan (Cozaar) 100 mg tablet; Take 1 tablet (100 mg) by mouth once daily.  -     metoprolol succinate XL (Toprol-XL) 50 mg 24 hr tablet; Take 1 tablet (50 mg) by mouth once daily.  -     CBC; Future  -     Comprehensive Metabolic Panel; Future  Cough, unspecified type  -     XR chest 2 views; Future  Asbestos exposure  -     XR chest 2 views; Future  Prostate cancer screening  -     PSA, total and free; Future         Patient presents for routine follow-up and other complaints     #Upper respiratory infection  #Otitis media  No issues at this time    #Chronic cough  #Exposure to asbestos  Recommend chest x-ray today     #Leg cramps  #Hypertension  Continue current regimen well-controlled     #Pickett's esophagus  Continue PPI twice daily changed to tablets per patient request     #Back pain  #Degenerative disc disease  Has followed with physical therapy with regards to this, did see neurosurgery with regards to this, he was cleared for therapy, monitor     #Bilateral carpal tunnel syndrome  Follows with orthopedic underwent surgical fixation, still having issues with the right side monitor     #abdominal cramping  #pancreatic cysts  Follows with GI as well as genetics due to family history of pancreatic cancer, follow-up on routine testing, overall stable has a planned MRI in the future     #PMR  Follows with rheumatology     #Health  maintenance  Lab work today routine testing      Recently had colonoscopy overall stable, advised age-appropriate vaccinations     Return to care in 6 months or prn

## 2024-05-28 ENCOUNTER — ALLIED HEALTH (OUTPATIENT)
Dept: INTEGRATIVE MEDICINE | Facility: CLINIC | Age: 64
End: 2024-05-28
Payer: COMMERCIAL

## 2024-05-28 DIAGNOSIS — M99.04 SACROILIAC JOINT SOMATIC DYSFUNCTION: ICD-10-CM

## 2024-05-28 DIAGNOSIS — M79.10 MYALGIA, UNSPECIFIED SITE: ICD-10-CM

## 2024-05-28 DIAGNOSIS — M54.2 CERVICALGIA: ICD-10-CM

## 2024-05-28 DIAGNOSIS — M54.17 LUMBOSACRAL RADICULOPATHY AT L5: ICD-10-CM

## 2024-05-28 DIAGNOSIS — M99.01 SOMATIC DYSFUNCTION OF CERVICAL REGION: ICD-10-CM

## 2024-05-28 DIAGNOSIS — M25.551 PAIN IN RIGHT HIP: ICD-10-CM

## 2024-05-28 DIAGNOSIS — M99.02 SOMATIC DYSFUNCTION OF THORACIC REGION: ICD-10-CM

## 2024-05-28 DIAGNOSIS — M99.03 SOMATIC DYSFUNCTION OF LUMBAR REGION: Primary | ICD-10-CM

## 2024-05-28 PROCEDURE — 98941 CHIROPRACT MANJ 3-4 REGIONS: CPT | Performed by: CHIROPRACTOR

## 2024-05-28 PROCEDURE — 97112 NEUROMUSCULAR REEDUCATION: CPT | Performed by: CHIROPRACTOR

## 2024-05-28 ASSESSMENT — ENCOUNTER SYMPTOMS
FATIGUE: 0
JOINT SWELLING: 0
ABDOMINAL PAIN: 0
CONFUSION: 0
DIARRHEA: 0
FREQUENCY: 0
CONSTIPATION: 0
TROUBLE SWALLOWING: 0
FEVER: 0
CHEST TIGHTNESS: 0
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
## 2024-07-02 ENCOUNTER — APPOINTMENT (OUTPATIENT)
Dept: INTEGRATIVE MEDICINE | Facility: CLINIC | Age: 64
End: 2024-07-02
Payer: COMMERCIAL

## 2024-07-02 DIAGNOSIS — M25.551 PAIN IN RIGHT HIP: ICD-10-CM

## 2024-07-02 DIAGNOSIS — M99.02 SOMATIC DYSFUNCTION OF THORACIC REGION: ICD-10-CM

## 2024-07-02 DIAGNOSIS — M99.04 SACROILIAC JOINT SOMATIC DYSFUNCTION: ICD-10-CM

## 2024-07-02 DIAGNOSIS — M54.2 CERVICALGIA: ICD-10-CM

## 2024-07-02 DIAGNOSIS — M54.17 LUMBOSACRAL RADICULOPATHY AT L5: ICD-10-CM

## 2024-07-02 DIAGNOSIS — M99.01 SOMATIC DYSFUNCTION OF CERVICAL REGION: ICD-10-CM

## 2024-07-02 DIAGNOSIS — M99.03 SOMATIC DYSFUNCTION OF LUMBAR REGION: Primary | ICD-10-CM

## 2024-07-02 PROCEDURE — 98941 CHIROPRACT MANJ 3-4 REGIONS: CPT | Performed by: CHIROPRACTOR

## 2024-07-02 PROCEDURE — 97112 NEUROMUSCULAR REEDUCATION: CPT | Performed by: CHIROPRACTOR
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
## 2024-08-05 ENCOUNTER — HOME HEALTH ADMISSION (OUTPATIENT)
Dept: HOME HEALTH SERVICES | Facility: HOME HEALTH | Age: 64
End: 2024-08-05
Payer: COMMERCIAL

## 2024-08-05 ENCOUNTER — DOCUMENTATION (OUTPATIENT)
Dept: HOME HEALTH SERVICES | Facility: HOME HEALTH | Age: 64
End: 2024-08-05
Payer: COMMERCIAL

## 2024-08-05 NOTE — HH CARE COORDINATION
Home Care received a Referral for Physical Therapy. We have processed the referral for a Start of Care on 8/7.     If you have any questions or concerns, please feel free to contact us at [phone]. Follow the prompts, enter your five digit zip code, and you will be directed to your care team on [facility] 3.

## 2024-08-07 ENCOUNTER — HOME CARE VISIT (OUTPATIENT)
Dept: HOME HEALTH SERVICES | Facility: HOME HEALTH | Age: 64
End: 2024-08-07

## 2024-08-09 ENCOUNTER — DOCUMENTATION (OUTPATIENT)
Dept: PRIMARY CARE | Facility: CLINIC | Age: 64
End: 2024-08-09
Payer: COMMERCIAL

## 2024-08-09 ENCOUNTER — PATIENT OUTREACH (OUTPATIENT)
Dept: CARE COORDINATION | Facility: CLINIC | Age: 64
End: 2024-08-09
Payer: COMMERCIAL

## 2024-08-09 ENCOUNTER — HOME CARE VISIT (OUTPATIENT)
Dept: HOME HEALTH SERVICES | Facility: HOME HEALTH | Age: 64
End: 2024-08-09
Payer: COMMERCIAL

## 2024-08-09 VITALS
BODY MASS INDEX: 27.02 KG/M2 | DIASTOLIC BLOOD PRESSURE: 76 MMHG | WEIGHT: 193 LBS | RESPIRATION RATE: 20 BRPM | SYSTOLIC BLOOD PRESSURE: 138 MMHG | HEIGHT: 71 IN

## 2024-08-09 PROCEDURE — G0151 HHCP-SERV OF PT,EA 15 MIN: HCPCS

## 2024-08-09 RX ORDER — TRAMADOL HYDROCHLORIDE 50 MG/1
50 TABLET ORAL EVERY 6 HOURS PRN
COMMUNITY

## 2024-08-09 RX ORDER — DOCUSATE SODIUM 100 MG/1
100 CAPSULE, LIQUID FILLED ORAL 2 TIMES DAILY PRN
COMMUNITY

## 2024-08-09 SDOH — HEALTH STABILITY: PHYSICAL HEALTH: EXERCISE TYPE: TKR PROCOTOL

## 2024-08-09 SDOH — HEALTH STABILITY: PHYSICAL HEALTH: PHYSICAL EXERCISE: SUPINE

## 2024-08-09 SDOH — HEALTH STABILITY: PHYSICAL HEALTH: EXERCISE ACTIVITY: ANKLE PUMPS, QUAD AND GLUT SETS, HEEL SLIDES AAROM, SAQ

## 2024-08-09 SDOH — HEALTH STABILITY: PHYSICAL HEALTH: PHYSICAL EXERCISE: 5

## 2024-08-09 SDOH — HEALTH STABILITY: PHYSICAL HEALTH: EXERCISE ACTIVITIES SETS: 1

## 2024-08-09 ASSESSMENT — ACTIVITIES OF DAILY LIVING (ADL)
AMBULATION ASSISTANCE: 1
AMBULATION_DISTANCE/DURATION_TOLERATED: 30 FEET X2
OASIS_M1830: 05
AMBULATION ASSISTANCE: STAND BY ASSIST
ENTERING_EXITING_HOME: STAND BY ASSIST
CURRENT_FUNCTION: CONTACT GUARD ASSIST
PHYSICAL TRANSFERS ASSESSED: 1
AMBULATION ASSISTANCE ON FLAT SURFACES: 1

## 2024-08-09 ASSESSMENT — ENCOUNTER SYMPTOMS
PERSON REPORTING PAIN: PATIENT
SUBJECTIVE PAIN PROGRESSION: GRADUALLY IMPROVING
OCCASIONAL FEELINGS OF UNSTEADINESS: 1
PAIN LOCATION: RIGHT KNEE
HIGHEST PAIN SEVERITY IN PAST 24 HOURS: 10/10
LOWEST PAIN SEVERITY IN PAST 24 HOURS: 5/10
PAIN SEVERITY GOAL: 0/10
HYPERTENSION: 1
PAIN: 1

## 2024-08-09 NOTE — PROGRESS NOTES
Called patient and left a voicemail to call back and schedule her annual follow-up with Dr. Monzon.   Discharge Facility:[facility]  Discharge Diagnosis:  Carbon monoxide poisoning   Admission Date:8/7/2024  Discharge Date: 8/8/2024    PCP Appointment Date:Declined  Specialist Appointment Date:   8/15/2024 Ortho/Jennifer  Hospital Encounter and Summary Linked: Yes  See discharge assessment below for further details  Engagement  Call Start Time: 1459 (8/9/2024  2:59 PM)    Medications  Medications reviewed with patient/caregiver?: Yes (8/9/2024  2:59 PM)  Is the patient having any side effects they believe may be caused by any medication additions or changes?: No (8/9/2024  2:59 PM)  Does the patient have all medications ordered at discharge?: Yes (8/9/2024  2:59 PM)  Care Management Interventions: No intervention needed (8/9/2024  2:59 PM)  Is the patient taking all medications as directed (includes completed medication regime)?: Yes (8/9/2024  2:59 PM)    Appointments  Does the patient have a primary care provider?: Yes (8/9/2024  2:59 PM)  Care Management Interventions: -- (Declines PCP follow up at the time of outreach) (8/9/2024  2:59 PM)  Has the patient kept scheduled appointments due by today?: Yes (8/9/2024  2:59 PM)    Self Management  What is the home health agency?: -- ([facility]) (8/9/2024  2:59 PM)  Has home health visited the patient within 72 hours of discharge?: Yes (8/9/2024  2:59 PM)  What Durable Medical Equipment (DME) was ordered?: -- ([facility]) (8/9/2024  2:59 PM)    Patient Teaching  Does the patient have access to their discharge instructions?: Yes (8/9/2024  2:59 PM)  What is the patient's perception of their health status since discharge?: Improving (8/9/2024  2:59 PM)  Is the patient/caregiver able to teach back the hierarchy of who to call/visit for symptoms/problems? PCP, Specialist, Home Health nurse, Urgent Care, ED, 911: Yes (8/9/2024  2:59 PM)    Wrap Up  Wrap Up Additional Comments: -- (Jenaro states that he and family are doing better. He was doing PT at time of outreach.He will  contact provider if any concerns.) (8/9/2024  2:59 PM)

## 2024-08-12 ENCOUNTER — HOME CARE VISIT (OUTPATIENT)
Dept: HOME HEALTH SERVICES | Facility: HOME HEALTH | Age: 64
End: 2024-08-12
Payer: COMMERCIAL

## 2024-08-12 VITALS
HEART RATE: 70 BPM | OXYGEN SATURATION: 98 % | TEMPERATURE: 99.2 F | DIASTOLIC BLOOD PRESSURE: 78 MMHG | SYSTOLIC BLOOD PRESSURE: 134 MMHG | RESPIRATION RATE: 18 BRPM

## 2024-08-12 PROCEDURE — G0157 HHC PT ASSISTANT EA 15: HCPCS | Mod: CQ

## 2024-08-12 ASSESSMENT — ENCOUNTER SYMPTOMS
PAIN SEVERITY GOAL: 0/10
PAIN LOCATION: RIGHT KNEE
SUBJECTIVE PAIN PROGRESSION: GRADUALLY IMPROVING
PERSON REPORTING PAIN: PATIENT
HIGHEST PAIN SEVERITY IN PAST 24 HOURS: 8/10
PAIN LOCATION - PAIN QUALITY: THROBBING
PAIN: 1
LOWEST PAIN SEVERITY IN PAST 24 HOURS: 2/10
PAIN LOCATION - PAIN FREQUENCY: FREQUENT
PAIN LOCATION - PAIN SEVERITY: 6/10

## 2024-08-14 ENCOUNTER — HOME CARE VISIT (OUTPATIENT)
Dept: HOME HEALTH SERVICES | Facility: HOME HEALTH | Age: 64
End: 2024-08-14
Payer: COMMERCIAL

## 2024-08-14 VITALS
TEMPERATURE: 97.5 F | DIASTOLIC BLOOD PRESSURE: 88 MMHG | SYSTOLIC BLOOD PRESSURE: 144 MMHG | OXYGEN SATURATION: 100 % | RESPIRATION RATE: 18 BRPM

## 2024-08-14 PROCEDURE — G0157 HHC PT ASSISTANT EA 15: HCPCS | Mod: CQ

## 2024-08-14 ASSESSMENT — ENCOUNTER SYMPTOMS
PAIN SEVERITY GOAL: 0/10
PAIN LOCATION - PAIN SEVERITY: 5/10
PAIN LOCATION: RIGHT KNEE
PAIN: 1
SUBJECTIVE PAIN PROGRESSION: GRADUALLY IMPROVING
PERSON REPORTING PAIN: PATIENT
LOWEST PAIN SEVERITY IN PAST 24 HOURS: 2/10
PAIN LOCATION - PAIN FREQUENCY: FREQUENT
PAIN LOCATION - PAIN QUALITY: THROBBING
HIGHEST PAIN SEVERITY IN PAST 24 HOURS: 6/10

## 2024-08-16 ENCOUNTER — HOME CARE VISIT (OUTPATIENT)
Dept: HOME HEALTH SERVICES | Facility: HOME HEALTH | Age: 64
End: 2024-08-16
Payer: COMMERCIAL

## 2024-08-16 VITALS
HEART RATE: 78 BPM | OXYGEN SATURATION: 98 % | SYSTOLIC BLOOD PRESSURE: 124 MMHG | RESPIRATION RATE: 18 BRPM | TEMPERATURE: 98.9 F | DIASTOLIC BLOOD PRESSURE: 82 MMHG

## 2024-08-16 PROCEDURE — G0157 HHC PT ASSISTANT EA 15: HCPCS | Mod: CQ

## 2024-08-16 ASSESSMENT — ENCOUNTER SYMPTOMS
PAIN: 1
SUBJECTIVE PAIN PROGRESSION: GRADUALLY IMPROVING
HIGHEST PAIN SEVERITY IN PAST 24 HOURS: 7/10
PAIN SEVERITY GOAL: 0/10
PAIN LOCATION - PAIN QUALITY: THROBBING
PERSON REPORTING PAIN: PATIENT
PAIN LOCATION - PAIN SEVERITY: 5/10
PAIN LOCATION: RIGHT KNEE
LOWEST PAIN SEVERITY IN PAST 24 HOURS: 3/10
PAIN LOCATION - RELIEVING FACTORS: ICE, OTC MEDS
PAIN LOCATION - PAIN FREQUENCY: FREQUENT

## 2024-08-19 ENCOUNTER — HOME CARE VISIT (OUTPATIENT)
Dept: HOME HEALTH SERVICES | Facility: HOME HEALTH | Age: 64
End: 2024-08-19
Payer: COMMERCIAL

## 2024-08-19 VITALS
HEART RATE: 74 BPM | TEMPERATURE: 99.1 F | SYSTOLIC BLOOD PRESSURE: 140 MMHG | OXYGEN SATURATION: 95 % | RESPIRATION RATE: 18 BRPM | DIASTOLIC BLOOD PRESSURE: 78 MMHG

## 2024-08-19 PROCEDURE — G0157 HHC PT ASSISTANT EA 15: HCPCS | Mod: CQ

## 2024-08-19 ASSESSMENT — ENCOUNTER SYMPTOMS
PAIN: 1
PAIN LOCATION: RIGHT KNEE
PAIN SEVERITY GOAL: 0/10
SUBJECTIVE PAIN PROGRESSION: GRADUALLY IMPROVING
PAIN LOCATION - PAIN SEVERITY: 5/10
PAIN LOCATION - PAIN QUALITY: THROBBING
PERSON REPORTING PAIN: PATIENT
HIGHEST PAIN SEVERITY IN PAST 24 HOURS: 6/10
PAIN LOCATION - PAIN FREQUENCY: FREQUENT
LOWEST PAIN SEVERITY IN PAST 24 HOURS: 2/10

## 2024-08-21 ENCOUNTER — HOME CARE VISIT (OUTPATIENT)
Dept: HOME HEALTH SERVICES | Facility: HOME HEALTH | Age: 64
End: 2024-08-21
Payer: COMMERCIAL

## 2024-08-21 PROCEDURE — G0157 HHC PT ASSISTANT EA 15: HCPCS | Mod: CQ

## 2024-08-21 ASSESSMENT — ENCOUNTER SYMPTOMS
PAIN LOCATION - PAIN FREQUENCY: INTERMITTENT
PAIN SEVERITY GOAL: 0/10
HIGHEST PAIN SEVERITY IN PAST 24 HOURS: 6/10
PAIN: 1
PAIN LOCATION - PAIN QUALITY: SHARP
LOWEST PAIN SEVERITY IN PAST 24 HOURS: 2/10
PAIN LOCATION - PAIN SEVERITY: 5/10
SUBJECTIVE PAIN PROGRESSION: GRADUALLY IMPROVING
PAIN LOCATION - EXACERBATING FACTORS: EXTENDED ACTIVITY
PAIN LOCATION - RELIEVING FACTORS: ICE, MEDS, REST
PAIN LOCATION: RIGHT KNEE
PERSON REPORTING PAIN: PATIENT

## 2024-08-23 ENCOUNTER — PATIENT OUTREACH (OUTPATIENT)
Dept: PRIMARY CARE | Facility: CLINIC | Age: 64
End: 2024-08-23
Payer: COMMERCIAL

## 2024-08-23 NOTE — PROGRESS NOTES
Call after hospitalization.  At time of outreach call the patient feels as if their condition has improved since last visit.  Reviewed the PCP appointment with the pt and addressed any questions or concerns.  Jenaro is doing well, PT states making good progress but he is unable to take pain meds which is challenging for him.

## 2024-08-26 ENCOUNTER — HOME CARE VISIT (OUTPATIENT)
Dept: HOME HEALTH SERVICES | Facility: HOME HEALTH | Age: 64
End: 2024-08-26
Payer: COMMERCIAL

## 2024-08-27 DIAGNOSIS — K86.2 PANCREATIC CYST (HHS-HCC): Primary | ICD-10-CM

## 2024-08-29 ENCOUNTER — HOME CARE VISIT (OUTPATIENT)
Dept: HOME HEALTH SERVICES | Facility: HOME HEALTH | Age: 64
End: 2024-08-29
Payer: COMMERCIAL

## 2024-08-29 ASSESSMENT — ACTIVITIES OF DAILY LIVING (ADL)
OASIS_M1830: 02
HOME_HEALTH_OASIS: 01

## 2024-08-29 NOTE — CASE COMMUNICATION
Patient requesting discharge from PT and all [facility] services.  Patient has now gone out of state. Patient instructed in signs and symptoms of infection, when to call MD or 911, fall prevention, safe progression of WHEP, safety with household transfers and ambulation with assistive device.

## 2024-09-04 SDOH — HEALTH STABILITY: PHYSICAL HEALTH: EXERCISE ACTIVITY: STRENGTHENING, ROM AND BALANCE

## 2024-09-04 SDOH — HEALTH STABILITY: PHYSICAL HEALTH: PHYSICAL EXERCISE: 10

## 2024-09-04 SDOH — HEALTH STABILITY: PHYSICAL HEALTH: EXERCISE TYPE: WHEP

## 2024-09-04 SDOH — HEALTH STABILITY: PHYSICAL HEALTH: PHYSICAL EXERCISE: SUPINE, SITTING AND STANDING

## 2024-09-04 SDOH — HEALTH STABILITY: PHYSICAL HEALTH: EXERCISE ACTIVITIES SETS: 1

## 2024-09-04 ASSESSMENT — ENCOUNTER SYMPTOMS
HIGHEST PAIN SEVERITY IN PAST 24 HOURS: 10/10
PAIN SEVERITY GOAL: 0/10
LOWEST PAIN SEVERITY IN PAST 24 HOURS: 8/10
SUBJECTIVE PAIN PROGRESSION: UNCHANGED

## 2024-09-04 ASSESSMENT — ACTIVITIES OF DAILY LIVING (ADL)
AMBULATION ASSISTANCE ON FLAT SURFACES: 1
AMBULATION_DISTANCE/DURATION_TOLERATED: 100 FEET
HOME_HEALTH_OASIS: 01
OASIS_M1830: 02

## 2024-09-04 NOTE — CASE COMMUNICATION
Spoke to patients wife on phone [phone].  Patient is having continued severe pain mostly at all times, she said and he is not able to take any of the pain meds prescribed.  They are in [place] at this time and are not planning to return in near future.  CG instructed in signs and symptoms of infection, when to call MD or 911, fall prevention, safe progression of WHEP, safety with household transfers and ambulation with assistive device.

## 2024-09-04 NOTE — PROGRESS NOTES
Physical Therapy    Physical Therapy Evaluation and Treatment      Patient Name: Jenaro Belcher  MRN: [id number]  Today's Date: 9/5/2024  Time Calculation  Start Time: 1455  Stop Time: 1527  Time Calculation (min): 32 min    Insurance - reviewed   Visit number: 1 (updated 09/05/24)   Payor:  EMPLOYEE MEDICAL PLAN / Plan:  EMPLOYEE MEDICAL PLAN CONSUMER [employer] / Product Type: *No Product type* /    30 PT/OT V PCY 12 USED NEEDS AUTH CONTIGO PAYS % OOP MET   Referring Provider: Redd Oconnor MD   DOS: R TKR on 8/25/24     Assessment:  Jenaro Belcher  is a 64 y.o. old patient who participated in a physical therapy evaluation today s/p R TKR on 8/25/24. Experiencing new onset of L knee and low back pain post-op: believes he has a leg length discrepancy, will ask surgeon at next appointment. Jenaro's impairments include: gait deficits, pain, decreased strength, and decreased range of motion.   Due to these impairments, he has the following functional limitations and participation restrictions: difficulty performing recreational activities and difficulty performing occupational activities. Jenaro's clinical presentation is stable and/or uncomplicated characteristics with the level of complexity being low. Jenaro's potential for rehab is good.  Skilled physical therapy services are appropriate and beneficial in order to achieve measurable and meaningful change in the objective tests and measures. Utilization of skilled physical therapy services will aid in advancing his functional status and attaining his therapy-related goals. Jenaro verbalized understanding and is in agreement with all goals and plan of care.  Jenaro left session with all questions answered and no increase in symptoms.      PT Assessment  PT Assessment Results: Decreased strength, Decreased range of motion, Pain  Rehab Prognosis: Good     Plan: progress R knee ROM/strength as olga lidia  OP PT Plan  Treatment/Interventions: Blood flow restriction  "therapy, Cryotherapy, Dry needling, Education/ Instruction, Electrical stimulation, Gait training, Hot pack, Manual therapy, Neuromuscular re-education, Taping techniques, Therapeutic activities, Therapeutic exercises, Ultrasound, Self care/ home management, Work conditioning  PT Plan: Skilled PT  PT Frequency:  (1-2 week)  Duration: 8 weeks  Rehab Potential: Good  Plan of Care Agreement: Patient    Current Problem:   Problem List Items Addressed This Visit    None  Visit Diagnoses         Codes    Presence of right artificial knee joint     Z96.651              Subjective      Jenaro Belcher  is a 64 y.o. male  presenting to the clinic s/p R TKR on 8/5/24. Completed home health therapy- compliant with most exercises. Has paused all DL strengthening due to pain in L knee. Feels he has a leg length discrepancy- wants to ask surgeon at next follow-up. Increased L knee pain and malia foot numbness due to new gait deviations. Discontinued device \"weeks ago\". Icing and elevating daily.     Reports symptoms are better with rest and ice    Reports symptoms are worse with walking and WB act    Jenaro Belcher  denies:  diplopia, drop attacks, dysarthria, dysphagia, dizziness, saddle anesthesia, bowel changes, bladder changes, unexpected weight loss within the past 4 weeks, and unexpected weight gain within the past 4 weeks    Jenaro Belcher  confirms: numbness and tingling R lateral knee and malia feet    Medical History Form: Reviewed (scanned into chart)    Living Environment: multi-level house    Occupation: Retired      Prior Level of Function: IND with all     Exercise: no-active at job    Functional Limitations: golf, hiking, and occupational activities    Pt goals for therapy:  \"get back to normal life and do something about L knee\"    Precautions:  Fall Risk: None   Denies: Cancer, Pacemaker, Diabetes, latex allergy, epilepsy/seizures, other known cardiac problems, other known neurologic problem, other known pulmonary " problems, unexpected weight gain or loss, saddle anesthesia, night sweats, night pain, diplopia, and drop attacks  Past Surgical history: R TKR on 8/25/24   Past Medical history: Hypertension- controlled    Pain:  4/10 R knee, 3/10 L knee, and 4/10 low back      Objective     Posture: WNL  Transfers: IND  Bed Mobility: IND  Gait: antalgic with decreased WB through RLE    Myotomes/Strength: MMT in sitting unless otherwise documented  Hip Flex (L2) R/L: 5/5  Hip Add R/L: 4+/4+  Hip Abd R/L: 4+/4+  Hip Ext R/L: NT  Knee Ext (L3) R/L: NT post-op extremity/5  Knee Flex R/L: NT post-op extremity/5  Ankle DF (L4) R/L:5/5  Ankle PF (S1) R/L: 5/5  Great Toe Ext (L5) R/L: NP      Range of Motion: measured in supine using goniometer in degrees  R Knee Flex: 94 AROM vs 100 AAROM  R Knee Ext: 9 above neutral    Incision: healing appropriately- no evidence of infection  Edema: mod R vs L knee      Outcome Measures:  Other Measures  Lower Extremity Funtional Score (LEFS): 25     Treatments:    Therapeutic Exercise:  12 minutes    ONLY PERFORMED 1 SET OF EACH EXERCISE FOR HEP DEMO  Access Code: [email or web address]  - Supine R Heel Slide with Strap  - 3-6 x daily - 7 x weekly - 3 sets - 10 reps - 5 sec hold  - Prone Knee Extension Hang  - 3-6 x daily - 7 x weekly - 1 sets - 1 reps - 2 min hold  - Seated R Knee Extension Stretch with Chair  - 3-6 x daily - 7 x weekly - 1 sets - 1 reps - 2 min hold  - Seated R Long Arc Quad  - 1 x daily - 7 x weekly - 3 sets - 10 reps  - Seated R Knee Extension AAROM  - 1 x daily - 7 x weekly - 3 sets - 10 reps  - Stair R knee flex stretch 5x10 sec      EDUCATION:  Outpatient Education  Individual(s) Educated: Patient  Education Provided: POC, Home Exercise Program  Risk and Benefits Discussed with Patient/Caregiver/Other: yes  Patient/Caregiver Demonstrated Understanding: yes  Plan of Care Discussed and Agreed Upon: yes  Patient Response to Education: Patient/Caregiver Verbalized Understanding of Information,  Patient/Caregiver Performed Return Demonstration of Exercises/Activities    -Educated Jenaro  on the role of PT and PT POC  -Educated Jenaro regarding benefit and purpose of skilled PT services along with results of examination findings and how this correlates to their chief concern  -Answered Jenaro's questions in full  -Jenaro Simi advised to hold any ex if it increases pain, Jenaro Belcher verbalized understanding    Goals:  STG (to be achieved in 3 weeks)  Jenaro Wittnic will independently perform HEP as assigned to promote self-management of symptoms and continue making functional gains outside of physical therapy.  Jenaro Belcher will report a reduction in R knee pain by at least 1/10 daily.    LTG (to be achieved by discharge)  Jenaro Jordangood will report 0/10 R knee pain performing 12 stairs to assist with navigating home/community environments.  Jenaro Belcher will increase LEFS score by at least 9 points (MCID) to demonstrate reduce pain and improvement with everyday tasks.  Jenaro Belcher will demonstrate equal WB through BLE during STS transfers.  Jenaro Belcher will improve standing and gait endurance to at least 45 mins with no > 1/10 R knee pain to assist with shopping within the community.   Jenaro Belcher will improve R knee AROM to at least 0-115 deg to restore motion s/p TKR.   Jenaro Belcher will improve R knee flex and ext strength to at least 4+/5 MMT grade to assist with return to prior level of mobility.    Time Calculation  Start Time: 1455  Stop Time: 1527  Time Calculation (min): 32 min  PT Evaluation Time Entry  PT Evaluation (Low) Time Entry: 20  PT Therapeutic Procedures Time Entry  Therapeutic Exercise Time Entry: 12

## 2024-09-05 ENCOUNTER — EVALUATION (OUTPATIENT)
Dept: PHYSICAL THERAPY | Facility: CLINIC | Age: 64
End: 2024-09-05
Payer: COMMERCIAL

## 2024-09-05 DIAGNOSIS — Z96.651 PRESENCE OF RIGHT ARTIFICIAL KNEE JOINT: ICD-10-CM

## 2024-09-05 PROCEDURE — 97161 PT EVAL LOW COMPLEX 20 MIN: CPT | Mod: GP

## 2024-09-05 PROCEDURE — 97110 THERAPEUTIC EXERCISES: CPT | Mod: GP

## 2024-09-05 SDOH — ECONOMIC STABILITY: FOOD INSECURITY: WITHIN THE PAST 12 MONTHS, YOU WORRIED THAT YOUR FOOD WOULD RUN OUT BEFORE YOU GOT MONEY TO BUY MORE.: NEVER TRUE

## 2024-09-05 SDOH — ECONOMIC STABILITY: FOOD INSECURITY: WITHIN THE PAST 12 MONTHS, THE FOOD YOU BOUGHT JUST DIDN'T LAST AND YOU DIDN'T HAVE MONEY TO GET MORE.: NEVER TRUE

## 2024-09-05 ASSESSMENT — PATIENT HEALTH QUESTIONNAIRE - PHQ9
1. LITTLE INTEREST OR PLEASURE IN DOING THINGS: NOT AT ALL
2. FEELING DOWN, DEPRESSED OR HOPELESS: NOT AT ALL
SUM OF ALL RESPONSES TO PHQ9 QUESTIONS 1 AND 2: 0

## 2024-09-05 ASSESSMENT — COLUMBIA-SUICIDE SEVERITY RATING SCALE - C-SSRS
2. HAVE YOU ACTUALLY HAD ANY THOUGHTS OF KILLING YOURSELF?: NO
6. HAVE YOU EVER DONE ANYTHING, STARTED TO DO ANYTHING, OR PREPARED TO DO ANYTHING TO END YOUR LIFE?: NO
1. IN THE PAST MONTH, HAVE YOU WISHED YOU WERE DEAD OR WISHED YOU COULD GO TO SLEEP AND NOT WAKE UP?: NO

## 2024-09-05 ASSESSMENT — ENCOUNTER SYMPTOMS
DEPRESSION: 0
OCCASIONAL FEELINGS OF UNSTEADINESS: 0
LOSS OF SENSATION IN FEET: 0

## 2024-09-06 ASSESSMENT — ENCOUNTER SYMPTOMS
SHORTNESS OF BREATH: 0
AGITATION: 0
DYSURIA: 0
VOMITING: 0
COLOR CHANGE: 0
NAUSEA: 0
FEVER: 0
DIZZINESS: 0
DIARRHEA: 0
DIFFICULTY URINATING: 0
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
## 2024-09-10 ENCOUNTER — TREATMENT (OUTPATIENT)
Dept: PHYSICAL THERAPY | Facility: CLINIC | Age: 64
End: 2024-09-10
Payer: COMMERCIAL

## 2024-09-10 ENCOUNTER — APPOINTMENT (OUTPATIENT)
Dept: INTEGRATIVE MEDICINE | Facility: CLINIC | Age: 64
End: 2024-09-10
Payer: COMMERCIAL

## 2024-09-10 DIAGNOSIS — Z96.651 PRESENCE OF RIGHT ARTIFICIAL KNEE JOINT: Primary | ICD-10-CM

## 2024-09-10 DIAGNOSIS — M99.04 SACROILIAC JOINT SOMATIC DYSFUNCTION: ICD-10-CM

## 2024-09-10 DIAGNOSIS — M54.17 LUMBOSACRAL RADICULOPATHY AT L5: ICD-10-CM

## 2024-09-10 DIAGNOSIS — M99.03 SOMATIC DYSFUNCTION OF LUMBAR REGION: Primary | ICD-10-CM

## 2024-09-10 DIAGNOSIS — M99.02 SOMATIC DYSFUNCTION OF THORACIC REGION: ICD-10-CM

## 2024-09-10 PROCEDURE — 97140 MANUAL THERAPY 1/> REGIONS: CPT | Mod: GP

## 2024-09-10 PROCEDURE — 97110 THERAPEUTIC EXERCISES: CPT | Mod: GP

## 2024-09-10 PROCEDURE — 97112 NEUROMUSCULAR REEDUCATION: CPT | Performed by: CHIROPRACTOR

## 2024-09-10 PROCEDURE — 98941 CHIROPRACT MANJ 3-4 REGIONS: CPT | Performed by: CHIROPRACTOR

## 2024-09-11 ENCOUNTER — HOSPITAL ENCOUNTER (OUTPATIENT)
Dept: RADIOLOGY | Facility: CLINIC | Age: 64
Discharge: HOME | End: 2024-09-11
Payer: COMMERCIAL

## 2024-09-11 DIAGNOSIS — K86.2 PANCREATIC CYST (HHS-HCC): ICD-10-CM

## 2024-09-11 PROCEDURE — 74183 MRI ABD W/O CNTR FLWD CNTR: CPT | Performed by: RADIOLOGY

## 2024-09-11 PROCEDURE — 74183 MRI ABD W/O CNTR FLWD CNTR: CPT

## 2024-09-11 PROCEDURE — A9575 INJ GADOTERATE MEGLUMI 0.1ML: HCPCS | Performed by: INTERNAL MEDICINE

## 2024-09-11 PROCEDURE — 76376 3D RENDER W/INTRP POSTPROCES: CPT | Performed by: RADIOLOGY

## 2024-09-11 PROCEDURE — 2550000001 HC RX 255 CONTRASTS: Performed by: INTERNAL MEDICINE

## 2024-09-11 RX ORDER — GADOTERATE MEGLUMINE 376.9 MG/ML
20 INJECTION INTRAVENOUS
Status: COMPLETED | OUTPATIENT
Start: 2024-09-11 | End: 2024-09-11

## 2024-09-12 ENCOUNTER — TREATMENT (OUTPATIENT)
Dept: PHYSICAL THERAPY | Facility: CLINIC | Age: 64
End: 2024-09-12
Payer: COMMERCIAL

## 2024-09-12 DIAGNOSIS — Z96.651 PRESENCE OF RIGHT ARTIFICIAL KNEE JOINT: Primary | ICD-10-CM

## 2024-09-12 PROCEDURE — 97110 THERAPEUTIC EXERCISES: CPT | Mod: GP,CQ

## 2024-09-12 PROCEDURE — 97530 THERAPEUTIC ACTIVITIES: CPT | Mod: GP,CQ

## 2024-09-12 NOTE — PROGRESS NOTES
"Physical Therapy    Physical Therapy Treatment    Patient Name: Jenaro Belcher  MRN: [id number]  Today's Date: 9/12/2024  Time Calculation  Start Time: 1445  Stop Time: 1515  Time Calculation (min): 30 min    Insurance - reviewed   Visit number: 3 (updated 09/12/24)   Payor:  EMPLOYEE MEDICAL PLAN / Plan:  EMPLOYEE MEDICAL PLAN CONSUMER SELECT / Product Type: *No Product type* /    30 PT/OT V PCY 12 USED NEEDS AUTH CONTIGO PAYS % OOP MET   Referring Provider: Redd Oconnor MD   DOS: R TKR on 8/25/24       Assessment:  Reduced tx time due to MD appt prior to PT today: pt did bring in order to have L knee evaluated (will have knee replaced next year most likely).  Did review how pt can perform similar ex on L knee as he performs on R, respecting sxs.  Significant increase in R knee flexion, or 115 degrees today.  R knee extension within 5 degrees, but with moderate pain at end range due to subjective reports of hamstring pain.  MOD warmth palpated R patellar region: educated pt in ongoing use of home TENS, ice.  Also issued tubigrip knee sleeve (size G), and biofreeze samples for pain relief.  Discussed altering work schedule to reduce continuous stand time.    Excellent gait pattern noted in clinic today, without assistive device       Plan: monitor R knee pain and ROM.  Evaluate L knee.      Current Problem  Problem List Items Addressed This Visit             ICD-10-CM    Presence of right artificial knee joint - Primary Z96.651         General  Subjective  Goes by \"Darren\"  Saw ortho today: brings in order to have L knee evaluated  Pt feels strength and fxnl mobility R LE not limited by lack of strength.  Using reciprocal pattern for stair climbing.  Sleep still disturbed by R knee pain, as unable to take meds.  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  (Self employed)    Jenaro Belhcer reports good compliance with HEP- stretching 3x day    Pain:  4-5/10 R knee, 5/10 L knee: worst " "c/o pain R hamstring due to knee flexion ex.  Pt also c/o pain R patellar region.      Precautions  Fall Risk: None   Denies: Cancer, Pacemaker, Diabetes, latex allergy, epilepsy/seizures, other known cardiac problems, other known neurologic problem, other known pulmonary problems, unexpected weight gain or loss, saddle anesthesia, night sweats, night pain, diplopia, and drop attacks  Past Surgical history: R TKR on 8/25/24   Past Medical history: Hypertension- controlled      Objective   R knee ROM  Flex: 100 deg AROM, 105 deg AAROM using strap overpressure and on leg press  Ext: 6 deg above neutral  9/12: 5-115 degrees in supine (heel slide)-easily obtained knee flexion    Treatments:  Assigned [facility] Access Code: [email or web address]     Therapeutic Exercise:  15 minutes    - ProII bike, seat 7, x 3 min then Airdyne x 3 min (seat 7  ): pt can begin using home stationary bike  -R knee flexion via heel slide : x 5: easily obtained 115 degrees  -Seated hamstring stretch 20\"x2  -Discussed seated LAQ, ham curls, hip ABD using GTB B  -heel prop: discussed only 9/12    Manual Therapy:    minutes : NP due to time  STM and myofascial cupping over R mid to distal quad avoiding incision. Performed with pt in supine    Therapeutic Activity  OP EDUCATION:HEP review/update.  Edema mgmt.    Pain relief: trial reducing amt of hours pt working continuously, and reducing hold time w/ knee flexion stretches now that he has met his ROM knee flexion goal to reduce hamstring pain   Wash care for tubigrip  Pt understood all information provided: yes      Time Calculation  Start Time: 1445  Stop Time: 1515  Time Calculation (min): 30 min     PT Therapeutic Procedures Time Entry  Therapeutic Exercise Time Entry: 15  Therapeutic Activity Time Entry: 15                  "

## 2024-09-16 NOTE — H&P (VIEW-ONLY)
"Physical Therapy    Physical Therapy Treatment    Patient Name: Jenaro Belcher  MRN: [id number]  Today's Date: 9/17/2024  Time Calculation  Start Time: 1449  Stop Time: 1532  Time Calculation (min): 43 min    Insurance - reviewed   Visit number: 4 (updated 09/17/24)   Payor:  EMPLOYEE MEDICAL PLAN / Plan:  EMPLOYEE MEDICAL PLAN CONSUMER SELECT / Product Type: *No Product type* /    30 PT/OT V PCY 12 USED NEEDS AUTH CONTIGO PAYS % OOP MET   Referring Provider: Redd Oconnor MD   DOS: R TKR on 8/25/24       Assessment:  Formal re-assessment not required to treat L knee impairments as long as R knee is still being treated per PSR II Riya Bai.  Additional emphasis/focus may be placed on L knee for pain control and to prolong future TKR   Mod RLE edema vs mild LLE due to sitting for extended periods of time at work  Issued more tubigrip due to reported benefit following last session  R knee ROM progressing as expected: mildly lacking ext (largely due to edema) and flex- will continue to monitor  R hamstring pain limiting higher level strengthening  Jenaro Belcher left session with all questions answered and mild increase in R knee pain to 5/10  Skilled physical therapy services continue to be required to return pt to PLOF s/p R TKR and prolong future L TKR.       Plan: monitor R knee pain and ROM      Current Problem  Problem List Items Addressed This Visit             ICD-10-CM       Musculoskeletal and Injuries    Presence of right artificial knee joint - Primary Z96.651           General  Subjective  Goes by \"Darren\"  Played 18 holes of golf last week: increased back and malia knee pain  Reduced R hamstring discomfort with performing HEP 1-2x day vs 3  L knee pain mildly improving as able to tolerate increased WB through RLE  No falls or other changes in status    Jenaro Belcher reports good compliance with HEP    Pain:  4/10 R knee, 3/10 L knee      Precautions  Fall Risk: None   Denies: Cancer, Pacemaker, " "Diabetes, latex allergy, epilepsy/seizures, other known cardiac problems, other known neurologic problem, other known pulmonary problems, unexpected weight gain or loss, saddle anesthesia, night sweats, night pain, diplopia, and drop attacks  Past Surgical history: R TKR on 8/25/24   Past Medical history: Hypertension- controlled      Objective   R knee ROM  Flex: 113 deg    Treatments:  Assigned HEP- [facility] Access Code: [email or web address]     Therapeutic Exercise:  38 minutes    - Airdyne bike, seat 5, x 5min   - R heel slides with strap OP x15 reps   - Seated resisted knee ext using red theraband 3x10 each side  - 2\" > 4\" box heel taps with unilateral UE support, 3x8  - 8\" box step ups leading with RLE x12 reps  - Leg press 60# x12 reps *discontinued due to R hamstring pain*  - Seated R hamstring stretch, 3x30 sec  - Terminal R knee ext ball on ball 3x10 with 3 sec hold      OP EDUCATION:HEP review/update. Cueing and rationale for all tx interventions.  Pt understood all information provided: yes      Time Calculation  Start Time: 1449  Stop Time: 1532  Time Calculation (min): 43 min     PT Therapeutic Procedures Time Entry  Therapeutic Exercise Time Entry: 38              Non-Billable Time  Non-billable time: 5  Non-billable time reason: Cold pack to R knee   "

## 2024-09-16 NOTE — PROGRESS NOTES
"Physical Therapy    Physical Therapy Treatment    Patient Name: Jenaro Belcher  MRN: [id number]  Today's Date: 9/17/2024  Time Calculation  Start Time: 1449  Stop Time: 1532  Time Calculation (min): 43 min    Insurance - reviewed   Visit number: 4 (updated 09/17/24)   Payor:  EMPLOYEE MEDICAL PLAN / Plan:  EMPLOYEE MEDICAL PLAN CONSUMER SELECT / Product Type: *No Product type* /    30 PT/OT V PCY 12 USED NEEDS AUTH CONTIGO PAYS % OOP MET   Referring Provider: Redd Oconnor MD   DOS: R TKR on 8/25/24       Assessment:  Formal re-assessment not required to treat L knee impairments as long as R knee is still being treated per PSR II Riya Bai.  Additional emphasis/focus may be placed on L knee for pain control and to prolong future TKR   Mod RLE edema vs mild LLE due to sitting for extended periods of time at work  Issued more tubigrip due to reported benefit following last session  R knee ROM progressing as expected: mildly lacking ext (largely due to edema) and flex- will continue to monitor  R hamstring pain limiting higher level strengthening  Jenaro Belcher left session with all questions answered and mild increase in R knee pain to 5/10  Skilled physical therapy services continue to be required to return pt to PLOF s/p R TKR and prolong future L TKR.       Plan: monitor R knee pain and ROM      Current Problem  Problem List Items Addressed This Visit             ICD-10-CM       Musculoskeletal and Injuries    Presence of right artificial knee joint - Primary Z96.651           General  Subjective  Goes by \"Darren\"  Played 18 holes of golf last week: increased back and malia knee pain  Reduced R hamstring discomfort with performing HEP 1-2x day vs 3  L knee pain mildly improving as able to tolerate increased WB through RLE  No falls or other changes in status    Jenaro Belcher reports good compliance with HEP    Pain:  4/10 R knee, 3/10 L knee      Precautions  Fall Risk: None   Denies: Cancer, Pacemaker, " "Diabetes, latex allergy, epilepsy/seizures, other known cardiac problems, other known neurologic problem, other known pulmonary problems, unexpected weight gain or loss, saddle anesthesia, night sweats, night pain, diplopia, and drop attacks  Past Surgical history: R TKR on 8/25/24   Past Medical history: Hypertension- controlled      Objective   R knee ROM  Flex: 113 deg    Treatments:  Assigned HEP- [facility] Access Code: [email or web address]     Therapeutic Exercise:  38 minutes    - Airdyne bike, seat 5, x 5min   - R heel slides with strap OP x15 reps   - Seated resisted knee ext using red theraband 3x10 each side  - 2\" > 4\" box heel taps with unilateral UE support, 3x8  - 8\" box step ups leading with RLE x12 reps  - Leg press 60# x12 reps *discontinued due to R hamstring pain*  - Seated R hamstring stretch, 3x30 sec  - Terminal R knee ext ball on ball 3x10 with 3 sec hold      OP EDUCATION:HEP review/update. Cueing and rationale for all tx interventions.  Pt understood all information provided: yes      Time Calculation  Start Time: 1449  Stop Time: 1532  Time Calculation (min): 43 min     PT Therapeutic Procedures Time Entry  Therapeutic Exercise Time Entry: 38              Non-Billable Time  Non-billable time: 5  Non-billable time reason: Cold pack to R knee   "

## 2024-09-17 ENCOUNTER — APPOINTMENT (OUTPATIENT)
Dept: PODIATRY | Facility: CLINIC | Age: 64
End: 2024-09-17
Payer: COMMERCIAL

## 2024-09-17 ENCOUNTER — TREATMENT (OUTPATIENT)
Dept: PHYSICAL THERAPY | Facility: CLINIC | Age: 64
End: 2024-09-17
Payer: COMMERCIAL

## 2024-09-17 DIAGNOSIS — L85.3 XEROSIS CUTIS: Primary | ICD-10-CM

## 2024-09-17 DIAGNOSIS — M79.672 LEFT FOOT PAIN: ICD-10-CM

## 2024-09-17 DIAGNOSIS — Z96.651 PRESENCE OF RIGHT ARTIFICIAL KNEE JOINT: Primary | ICD-10-CM

## 2024-09-17 DIAGNOSIS — B35.1 ONYCHOMYCOSIS: ICD-10-CM

## 2024-09-17 DIAGNOSIS — B35.3 TINEA PEDIS OF LEFT FOOT: ICD-10-CM

## 2024-09-17 PROCEDURE — 1036F TOBACCO NON-USER: CPT | Performed by: PODIATRIST

## 2024-09-17 PROCEDURE — 97110 THERAPEUTIC EXERCISES: CPT | Mod: GP

## 2024-09-17 PROCEDURE — 99204 OFFICE O/P NEW MOD 45 MIN: CPT | Performed by: PODIATRIST

## 2024-09-17 PROCEDURE — 99214 OFFICE O/P EST MOD 30 MIN: CPT | Performed by: PODIATRIST

## 2024-09-17 RX ORDER — KETOCONAZOLE 20 MG/G
CREAM TOPICAL
Qty: 30 G | Refills: 1 | Status: SHIPPED | OUTPATIENT
Start: 2024-09-17

## 2024-09-17 RX ORDER — UREA 40 %
CREAM (GRAM) TOPICAL
Qty: 85 G | Refills: 1 | Status: SHIPPED | OUTPATIENT
Start: 2024-09-17 | End: 2024-11-16

## 2024-09-17 NOTE — PROGRESS NOTES
Chief Complaint   Patient presents with    Foot Callouses     New Patient is here today for foot callus left foot.  Started years ago. Recently had knee replacement. Uses a Dremel on the area and OTC medication.        Painful plantar aspect left foot.  Duration many years.  He does use a Dremel device at home to smooth it down.  Denies itching.  He does this twice per week when he was actively working.  If he does not do this the skin will crack open.  He is only used over-the-counter medication on it/antifungal.  This can be painful when it builds up.    He has also had longstanding nail problems.    Admits to dry skin around the heels.  No other complaints at this time.  .   Recent right knee surgery.   Nondiabetic.  Non-smoker.    Review of systems negative except as noted above.      Cases and allergies reviewed.    General/Constitutional: Alert. NAD.   Respiratory: Non labored breathing.   Psychiatric: Mood and affect normal/baseline.   HEENT: Sclera clear. Wearing corrective lenses.  Dermatologic: Nails are dystrophic with subungual debris.  No acute inflammatory infectious process. Web spaces are dry. No ulcers no pre-ulcerative areas.  Mild callus tissue around the heels.  There is moderate callus tissue beneath the lesser MTP joints on the left foot.  Mild scaling noted.  No fissuring noted.  No acute inflammation.  No palpable pain.  No bulla noted.  Vascular: Pedal pulses are intact and symmetric including the dorsalis pedis and the posterior tibial pulses. Feet are warm to touch. No swelling appreciated either extremity.  Neurological: Alert and oriented. No acute distress. No sensory impairment bilateral.  Monofilament testing normal.  Musculoskeletal: Strength is normal for age. No acute deficits appreciated.  Has brace on the left knee.  Compression on the right knee.    Impression: Painful hyperkeratotic tissue left foot, tinea pedis.  Onychomycosis.    -Today's treatment and course of  therapy was discussed with the patient in detail. Patient's questions were answered. Proper foot care was discussed. This dictation was done using Dragon computer software and as such may contain grammatical errors.    -Will prescribe urea cream apply to the heels as well as the bottom of the left foot cover with a plastic wrap.  Do this daily.    -Prescribed ketoconazole.  Use this when you once daily but now with the ammonium lactate.    -Will obtain nail culture on follow-up.  Please let the nails grow out.    -Counseled patient on proper footcare.    -Follow-up 4 weeks

## 2024-09-18 NOTE — PROGRESS NOTES
"Physical Therapy    Physical Therapy Treatment    Patient Name: Jenaro Belcher  MRN: [id number]  Today's Date: 9/19/2024       Insurance - reviewed   Visit number: Visit count could not be calculated. Make sure you are using a visit which is associated with an episode. (updated 09/18/24)   Payor:  EMPLOYEE MEDICAL PLAN / Plan:  EMPLOYEE MEDICAL PLAN CONSUMER SELECT / Product Type: *No Product type* /    30 PT/OT V PCY 12 USED NEEDS AUTH CONTIGO PAYS % OOP MET   Referring Provider: Redd Oconnor MD   DOS: R TKR on 8/25/24       Assessment:  ***  Jenaro Belcher left session with all questions answered and mild increase in R knee pain to 5/10  Skilled physical therapy services continue to be required to return pt to PLOF s/p R TKR and prolong future L TKR.       Plan: monitor R knee pain and ROM      Current Problem  Problem List Items Addressed This Visit    None            General  Subjective  Goes by \"Darren\"  ***  No falls or other changes in status    Jenaro Belcher reports good compliance with HEP    Pain:  4/10 R knee, 3/10 L knee      Precautions  Fall Risk: None   Denies: Cancer, Pacemaker, Diabetes, latex allergy, epilepsy/seizures, other known cardiac problems, other known neurologic problem, other known pulmonary problems, unexpected weight gain or loss, saddle anesthesia, night sweats, night pain, diplopia, and drop attacks  Past Surgical history: R TKR on 8/25/24   Past Medical history: Hypertension- controlled      Objective   R knee ROM  ***    Treatments:  [facility]: [email or web address]     Therapeutic Exercise:  38 minutes    - Airdyne bike, seat 5, x 5min     NP this session  - R heel slides with strap OP x15 reps   - Seated resisted knee ext using red theraband 3x10 each side  - 2\" > 4\" box heel taps with unilateral UE support, 3x8  - 8\" box step ups leading with RLE x12 reps  - Leg press 60# x12 reps *discontinued due to R hamstring pain*  - Seated R hamstring stretch, 3x30 " sec  - Terminal R knee ext ball on ball 3x10 with 3 sec hold      OP EDUCATION:HEP review/update. Cueing and rationale for all tx interventions.  Pt understood all information provided: yes

## 2024-09-19 ENCOUNTER — ANESTHESIA EVENT (OUTPATIENT)
Dept: GASTROENTEROLOGY | Facility: HOSPITAL | Age: 64
End: 2024-09-19
Payer: COMMERCIAL

## 2024-09-19 ENCOUNTER — HOSPITAL ENCOUNTER (OUTPATIENT)
Dept: GASTROENTEROLOGY | Facility: HOSPITAL | Age: 64
Setting detail: OUTPATIENT SURGERY
Discharge: HOME | End: 2024-09-19
Payer: COMMERCIAL

## 2024-09-19 ENCOUNTER — APPOINTMENT (OUTPATIENT)
Dept: GASTROENTEROLOGY | Facility: HOSPITAL | Age: 64
End: 2024-09-19
Payer: COMMERCIAL

## 2024-09-19 ENCOUNTER — APPOINTMENT (OUTPATIENT)
Dept: PHYSICAL THERAPY | Facility: CLINIC | Age: 64
End: 2024-09-19
Payer: COMMERCIAL

## 2024-09-19 ENCOUNTER — ANESTHESIA (OUTPATIENT)
Dept: GASTROENTEROLOGY | Facility: HOSPITAL | Age: 64
End: 2024-09-19
Payer: COMMERCIAL

## 2024-09-19 VITALS
BODY MASS INDEX: 26.88 KG/M2 | DIASTOLIC BLOOD PRESSURE: 88 MMHG | RESPIRATION RATE: 18 BRPM | OXYGEN SATURATION: 98 % | HEIGHT: 71 IN | WEIGHT: 192 LBS | TEMPERATURE: 97.7 F | HEART RATE: 65 BPM | SYSTOLIC BLOOD PRESSURE: 133 MMHG

## 2024-09-19 DIAGNOSIS — K86.89 MASS OF HEAD OF PANCREAS (HHS-HCC): Primary | ICD-10-CM

## 2024-09-19 PROCEDURE — 2500000005 HC RX 250 GENERAL PHARMACY W/O HCPCS: Performed by: ANESTHESIOLOGIST ASSISTANT

## 2024-09-19 PROCEDURE — A43239 PR EDG TRANSORAL BIOPSY SINGLE/MULTIPLE: Performed by: ANESTHESIOLOGIST ASSISTANT

## 2024-09-19 PROCEDURE — 43239 EGD BIOPSY SINGLE/MULTIPLE: CPT | Performed by: INTERNAL MEDICINE

## 2024-09-19 PROCEDURE — A43239 PR EDG TRANSORAL BIOPSY SINGLE/MULTIPLE: Performed by: ANESTHESIOLOGY

## 2024-09-19 PROCEDURE — 43259 EGD US EXAM DUODENUM/JEJUNUM: CPT | Performed by: INTERNAL MEDICINE

## 2024-09-19 PROCEDURE — 2500000004 HC RX 250 GENERAL PHARMACY W/ HCPCS (ALT 636 FOR OP/ED): Performed by: ANESTHESIOLOGIST ASSISTANT

## 2024-09-19 RX ORDER — SODIUM CHLORIDE, SODIUM LACTATE, POTASSIUM CHLORIDE, CALCIUM CHLORIDE 600; 310; 30; 20 MG/100ML; MG/100ML; MG/100ML; MG/100ML
20 INJECTION, SOLUTION INTRAVENOUS CONTINUOUS
Status: CANCELLED | OUTPATIENT
Start: 2024-09-19

## 2024-09-19 RX ORDER — SODIUM CHLORIDE, SODIUM LACTATE, POTASSIUM CHLORIDE, CALCIUM CHLORIDE 600; 310; 30; 20 MG/100ML; MG/100ML; MG/100ML; MG/100ML
100 INJECTION, SOLUTION INTRAVENOUS CONTINUOUS
OUTPATIENT
Start: 2024-09-19

## 2024-09-19 RX ORDER — LIDOCAINE HYDROCHLORIDE 10 MG/ML
0.1 INJECTION, SOLUTION INFILTRATION; PERINEURAL ONCE
OUTPATIENT
Start: 2024-09-19 | End: 2024-09-19

## 2024-09-19 RX ORDER — FENTANYL CITRATE 50 UG/ML
INJECTION, SOLUTION INTRAMUSCULAR; INTRAVENOUS CONTINUOUS PRN
Status: DISCONTINUED | OUTPATIENT
Start: 2024-09-19 | End: 2024-09-19

## 2024-09-19 RX ORDER — PROPOFOL 10 MG/ML
INJECTION, EMULSION INTRAVENOUS AS NEEDED
Status: DISCONTINUED | OUTPATIENT
Start: 2024-09-19 | End: 2024-09-19

## 2024-09-19 RX ORDER — ONDANSETRON HYDROCHLORIDE 2 MG/ML
4 INJECTION, SOLUTION INTRAVENOUS ONCE AS NEEDED
OUTPATIENT
Start: 2024-09-19

## 2024-09-19 RX ORDER — LIDOCAINE HYDROCHLORIDE 20 MG/ML
INJECTION, SOLUTION EPIDURAL; INFILTRATION; INTRACAUDAL; PERINEURAL AS NEEDED
Status: DISCONTINUED | OUTPATIENT
Start: 2024-09-19 | End: 2024-09-19

## 2024-09-19 SDOH — HEALTH STABILITY: MENTAL HEALTH: CURRENT SMOKER: 0

## 2024-09-19 ASSESSMENT — PAIN - FUNCTIONAL ASSESSMENT
PAIN_FUNCTIONAL_ASSESSMENT: 0-10

## 2024-09-19 ASSESSMENT — COLUMBIA-SUICIDE SEVERITY RATING SCALE - C-SSRS
2. HAVE YOU ACTUALLY HAD ANY THOUGHTS OF KILLING YOURSELF?: NO
1. IN THE PAST MONTH, HAVE YOU WISHED YOU WERE DEAD OR WISHED YOU COULD GO TO SLEEP AND NOT WAKE UP?: NO
6. HAVE YOU EVER DONE ANYTHING, STARTED TO DO ANYTHING, OR PREPARED TO DO ANYTHING TO END YOUR LIFE?: NO

## 2024-09-19 ASSESSMENT — PAIN SCALES - GENERAL
PAINLEVEL_OUTOF10: 3
PAINLEVEL_OUTOF10: 4
PAINLEVEL_OUTOF10: 3
PAINLEVEL_OUTOF10: 0 - NO PAIN

## 2024-09-19 NOTE — ANESTHESIA POSTPROCEDURE EVALUATION
Patient: Jenaro Belcher    Procedure Summary       Date: 09/19/24 Room / Location: [facility]    Anesthesia Start: 0730 Anesthesia Stop: 0818    Procedure: ENDOSCOPIC ULTRASOUND (UPPER) Diagnosis: Mass of head of pancreas ([facility])    Scheduled Providers: Mago Palomo MD Responsible Provider: Chris Abreu MD    Anesthesia Type: MAC ASA Status: 2            Anesthesia Type: MAC    Vitals Value Taken Time   /90 09/19/24 0818   Temp 36.7 09/19/24 0818   Pulse 63 09/19/24 0818   Resp 14 09/19/24 0818   SpO2 97 09/19/24 0818       Anesthesia Post Evaluation    Patient location during evaluation: bedside  Patient participation: complete - patient participated  Level of consciousness: awake  Pain management: adequate  Airway patency: patent  Cardiovascular status: acceptable  Respiratory status: acceptable  Hydration status: acceptable  Postoperative Nausea and Vomiting: none      No notable events documented.

## 2024-09-19 NOTE — Clinical Note
Patient tolerated the procedure well and is comfortable with no complaints of pain. Vital signs stable. Arousable prior to transport. Patient transported to Phase II via stretcher. Handoff completed.

## 2024-09-19 NOTE — DISCHARGE INSTRUCTIONS

## 2024-09-19 NOTE — Clinical Note
Pre - pancreatic head cyst  EGD - biopsies  EUS   Post - r/o h pylori, r/o Pickett's, small pancreatic cyst, liver cyst

## 2024-09-19 NOTE — ANESTHESIA PREPROCEDURE EVALUATION
Patient: Jenaro Belcher    Procedure Information       Date/Time: 09/19/24 0730    Scheduled providers: Mago Palomo MD    Procedure: ENDOSCOPIC ULTRASOUND (UPPER)    Location: [facility]            Relevant Problems   Cardiac   (+) Chronic right-sided thoracic back pain   (+) Hypertension      Pulmonary  CAMERON risk      Neuro   (+) Carpal tunnel syndrome on right   (+) Left carpal tunnel syndrome   (+) Lumbago with sciatica   (+) Lumbosacral radiculopathy at L5   (+) Meralgia paresthetica   (+) Right cervical radiculopathy   (+) Right lumbar radiculopathy      GI  Lesion on pancreas   (+) Gastroesophageal reflux disease   (+) Hiatal hernia      Liver   (+) Liver disease      Hematology   (+) Anemia      Musculoskeletal   (+) Carpal tunnel syndrome on right   (+) Cervical spondylosis without myelopathy   (+) Left carpal tunnel syndrome   (+) Lumbar spondylosis   (+) Spinal stenosis, multilevel      HEENT   (+) Seasonal allergies      ID   (+) Acute bacterial bronchitis       Clinical information reviewed:   Tobacco  Allergies  Meds   Med Hx  Surg Hx   Fam Hx  Soc Hx        NPO Detail:  NPO/Void Status  Carbohydrate Drink Given Prior to Surgery? : N  Date of Last Liquid: 09/19/24  Time of Last Liquid: 0415  Date of Last Solid: 09/18/24  Time of Last Solid: 2100  Last Intake Type: Clear fluids  Time of Last Void: 0500         Physical Exam    Airway  Mallampati: II  TM distance: >3 FB     Cardiovascular   Rhythm: regular     Dental - normal exam     Pulmonary    Abdominal            Anesthesia Plan    History of general anesthesia?: yes  History of complications of general anesthesia?: no    ASA 2     MAC     The patient is not a current smoker.    intravenous induction   Anesthetic plan and risks discussed with patient.

## 2024-09-24 ENCOUNTER — APPOINTMENT (OUTPATIENT)
Dept: INTEGRATIVE MEDICINE | Facility: CLINIC | Age: 64
End: 2024-09-24
Payer: COMMERCIAL

## 2024-09-25 LAB
LABORATORY COMMENT REPORT: NORMAL
PATH REPORT.FINAL DX SPEC: NORMAL
PATH REPORT.GROSS SPEC: NORMAL
PATH REPORT.RELEVANT HX SPEC: NORMAL
PATH REPORT.TOTAL CANCER: NORMAL

## 2024-09-27 ENCOUNTER — APPOINTMENT (OUTPATIENT)
Dept: PHYSICAL THERAPY | Facility: CLINIC | Age: 64
End: 2024-09-27
Payer: COMMERCIAL

## 2024-09-30 NOTE — PROGRESS NOTES
"Physical Therapy    Physical Therapy Treatment    Patient Name: Jenaro Belcher  MRN: [id number]  Today's Date: 10/1/2024  Time Calculation  Start Time: 1417  Stop Time: 1455  Time Calculation (min): 38 min    Insurance - reviewed   Visit number: 5 (updated 10/01/24)   Payor: [employer] [facility] / Plan: [facility] HMO / Product Type: *No Product type* /  (Previously  Employee Plan)  30 PT/OT V PCY 12 USED NEEDS AUTH CONTIGO PAYS % OOP MET   Referring Provider: Redd Oconnor MD   DOS: R TKR on 8/25/24       Assessment:  Re-assessment performed today after Jenaro Belcher has attended 5V s/p R TKR on 8/25/24. Treatments have primarily consisted of: TherEx, TherAct, and Manual Therapy. Jenaro Belcher has demonstrated improvements in R knee ROM/strength, pain, and gait endurance since initial evaluation. He has returned to work without limitations (mild-moderate pain at end of day which improves with rest/ice). Impairments in strength and pain remain at this time. Pt requesting discharge from PT services at this time due to financial constraints. Fair progress made towards established goals. HEP updated/reviewed for global hip/knee [place] strengthening to prolong L TKR. Jenaro Belcher left session with all questions answered and no reported increase in symptoms.      Plan: discharge from PT services      Current Problem  Problem List Items Addressed This Visit             ICD-10-CM       Musculoskeletal and Injuries    Presence of right artificial knee joint - Primary Z96.651         General  Subjective  Goes by \"Darren\"    New insurance as of 10/1- pt requesting today as final appointment due to financial constraints  Would like to add more exercises focusing on L/non-op knee to prolong future TKR  No falls or other changes in status    Jenaro Belcher reports good compliance with HEP    Pain:  4-5/10 R knee, 3-4/10 L knee      Precautions  Fall Risk: None   Denies: Cancer, Pacemaker, Diabetes, latex " allergy, epilepsy/seizures, other known cardiac problems, other known neurologic problem, other known pulmonary problems, unexpected weight gain or loss, saddle anesthesia, night sweats, night pain, diplopia, and drop attacks  Past Surgical history: R TKR on 8/25/24   Past Medical history: Hypertension- controlled      Objective   R knee  0-110 AROM/112 AAROM    Myotomes/Strength: MMT in sitting unless otherwise documented  Hip Flex (L2) R/L: 5/5  Hip Add R/L: 4+/4+  Hip Abd R/L: 5/5  Hip Ext R/L: NT  Knee Ext (L3) R/L: 4+/5  Knee Flex R/L: NT 4+/5  Ankle DF (L4) R/L:5/5  Ankle PF (S1) R/L: 5/5  Great Toe Ext (L5) R/L: NP    LEFS: 53    Goals:  STG (to be achieved in 3 weeks)  Jenaro Belcher will independently perform HEP as assigned to promote self-management of symptoms and continue making functional gains outside of physical therapy.  10/1/24: GOAL MET  Jenaro Wittii will report a reduction in R knee pain by at least 1/10 daily.  10/1/24: GOAL MET     LTG (to be achieved by discharge)  Jenaro Belcher will report 0/10 R knee pain performing 12 stairs to assist with navigating home/community environments.  10/1/24: PROGRESSED, NOT MET- reports 3-4/10 pain baseline, does not increase with stairs  Jenaro Wittii will increase LEFS score by at least 9 points (MCID) to demonstrate reduce pain and improvement with everyday tasks.  10/1/24: GOAL MET  Jenaro Jordanizii will demonstrate equal WB through BLE during STS transfers.  10/1/24: GOAL MET  Jenaro Egizii will improve standing and gait endurance to at least 45 mins with no > 1/10 R knee pain to assist with shopping within the community.   10/1/24: PROGRESSED NOT MET- reports 3-4/10 pain baseline, does not increase with walking  Jenaro Egizii will improve R knee AROM to at least 0-115 deg to restore motion s/p TKR.   10/1/24: ALMOST MET- R knee  0-110 AROM vs 112 AAROM  Jenaro Wittii will improve R knee flex and ext strength to at least 4+/5 MMT grade to assist with return  to prior level of mobility.  10/1/24: GOAL MET      Treatments:  Assigned HEP- Medbridge Access Code: [email or web address]     Therapeutic Activity: 8 minutes  Re-assessment: see subjective, objective, assessment, and updated goals     Therapeutic Exercise:  30 minutes    - Airdyne bike, seat 5, x 5min   - Seated LAQ using 5# ankle weight, 3x10 with 5 sec hold each side  - Prone hamstring curls with 5# ankle weight, 3x10 each side   - Sidelying Hip Abduction, 3x10 each side  - Sidelying Hip Adduction, 3x10 each side  - Seated Hip Adduction Isometrics with Ball - 2 sets - 10 reps - 5 sec hold  - Seated Hip Abduction with Resistance - 2 sets - 10 reps - 3-5 sec hold      OP EDUCATION:HEP review/update. Cueing and rationale for all tx interventions.  Pt understood all information provided: yes      Time Calculation  Start Time: 1417  Stop Time: 1455  Time Calculation (min): 38 min     PT Therapeutic Procedures Time Entry  Therapeutic Exercise Time Entry: 30  Therapeutic Activity Time Entry: 8

## 2024-10-01 ENCOUNTER — TREATMENT (OUTPATIENT)
Dept: PHYSICAL THERAPY | Facility: CLINIC | Age: 64
End: 2024-10-01
Payer: COMMERCIAL

## 2024-10-01 DIAGNOSIS — Z96.651 PRESENCE OF RIGHT ARTIFICIAL KNEE JOINT: Primary | ICD-10-CM

## 2024-10-01 PROCEDURE — 97530 THERAPEUTIC ACTIVITIES: CPT | Mod: GP

## 2024-10-01 PROCEDURE — 97110 THERAPEUTIC EXERCISES: CPT | Mod: GP

## 2024-10-03 ENCOUNTER — APPOINTMENT (OUTPATIENT)
Dept: PHYSICAL THERAPY | Facility: CLINIC | Age: 64
End: 2024-10-03
Payer: COMMERCIAL

## 2024-10-08 ENCOUNTER — APPOINTMENT (OUTPATIENT)
Dept: INTEGRATIVE MEDICINE | Facility: CLINIC | Age: 64
End: 2024-10-08
Payer: COMMERCIAL

## 2024-10-08 DIAGNOSIS — M99.02 SOMATIC DYSFUNCTION OF THORACIC REGION: ICD-10-CM

## 2024-10-08 DIAGNOSIS — M99.04 SACROILIAC JOINT SOMATIC DYSFUNCTION: ICD-10-CM

## 2024-10-08 DIAGNOSIS — M54.17 LUMBOSACRAL RADICULOPATHY AT L5: ICD-10-CM

## 2024-10-08 DIAGNOSIS — M99.03 SOMATIC DYSFUNCTION OF LUMBAR REGION: Primary | ICD-10-CM

## 2024-10-08 PROCEDURE — 97112 NEUROMUSCULAR REEDUCATION: CPT | Performed by: CHIROPRACTOR

## 2024-10-08 PROCEDURE — 98941 CHIROPRACT MANJ 3-4 REGIONS: CPT | Performed by: CHIROPRACTOR

## 2024-10-08 ASSESSMENT — ENCOUNTER SYMPTOMS
COLOR CHANGE: 0
DIFFICULTY URINATING: 0
DIZZINESS: 0
DIARRHEA: 0
SHORTNESS OF BREATH: 0
VOMITING: 0
FEVER: 0
NAUSEA: 0
DYSURIA: 0
AGITATION: 0
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
## 2024-10-16 ENCOUNTER — PATIENT OUTREACH (OUTPATIENT)
Dept: PRIMARY CARE | Facility: CLINIC | Age: 64
End: 2024-10-16
Payer: COMMERCIAL

## 2024-10-21 ENCOUNTER — OFFICE VISIT (OUTPATIENT)
Dept: PODIATRY | Facility: CLINIC | Age: 64
End: 2024-10-21
Payer: COMMERCIAL

## 2024-10-21 DIAGNOSIS — B35.3 TINEA PEDIS OF LEFT FOOT: ICD-10-CM

## 2024-10-21 DIAGNOSIS — L85.3 XEROSIS CUTIS: Primary | ICD-10-CM

## 2024-10-21 DIAGNOSIS — M79.672 LEFT FOOT PAIN: ICD-10-CM

## 2024-10-21 DIAGNOSIS — B35.1 ONYCHOMYCOSIS: ICD-10-CM

## 2024-10-21 PROCEDURE — 87102 FUNGUS ISOLATION CULTURE: CPT | Mod: OUT | Performed by: PODIATRIST

## 2024-10-21 PROCEDURE — 99213 OFFICE O/P EST LOW 20 MIN: CPT | Performed by: PODIATRIST

## 2024-10-21 PROCEDURE — 1036F TOBACCO NON-USER: CPT | Performed by: PODIATRIST

## 2024-10-21 PROCEDURE — 11755 BIOPSY NAIL UNIT: CPT | Performed by: PODIATRIST

## 2024-10-21 NOTE — PROGRESS NOTES
Chief Complaint   Patient presents with    Foot Callouses     Patient is here today for foot callus left foot and nail culture      Follow-up left foot.  Seems to be doing much better.  Using urea with occlusion.  Denies itching with the feet.  Has not been using the Dremel.  Admits to dry skin around the heels.  No other complaints at this time.  .   Recent right knee surgery.   Nondiabetic.  Non-smoker.    Review of systems negative except as noted above.      Medications and allergies reviewed.    General/Constitutional: Alert. NAD.   Respiratory: Non labored breathing.   Psychiatric: Mood and affect normal/baseline.   HEENT: Sclera clear. Wearing corrective lenses.  Dermatologic: Multiple dystrophic nail left foot.  Subungual debris noted.  This includes the first and second digits.  Webspaces are dry.  No tinea pedis.  Callus tissue around the heels and forefoot has improved.  No fissures noted.  No acute inflammation.  No discomfort.  No scaly skin.    Vascular: Pedal pulses are intact and symmetric including the dorsalis pedis and the posterior tibial pulses. Feet are warm to touch. No swelling appreciated either extremity.  Neurological: Alert and oriented. No acute distress. No sensory impairment bilateral.  Monofilament testing normal.  Musculoskeletal: Strength is normal for age. No acute deficits appreciated.  Has brace on the left knee.  Compression on the right knee.    Impression: Painful hyperkeratotic tissue left foot, tinea pedis.  Onychomycosis.    -Today's treatment and course of therapy was discussed with the patient in detail. Patient's questions were answered. Proper foot care was discussed. This dictation was done using Dragon computer software and as such may contain grammatical errors.    -Counseled patient on proper footcare.    -Okay to continue with urea cream apply to the heels as well as the bottom of the left foot cover with a plastic wrap.  Do this daily.    -Okay to stop  ketoconazole at this time.      -Culture taken multiple nails left foot.    -Counseled patient on proper footcare.    -Follow-up in 5 to 6 weeks.

## 2024-10-29 ENCOUNTER — LAB REQUISITION (OUTPATIENT)
Dept: LAB | Facility: HOSPITAL | Age: 64
End: 2024-10-29
Payer: COMMERCIAL

## 2024-10-29 DIAGNOSIS — B35.1 TINEA UNGUIUM: ICD-10-CM

## 2024-11-06 ENCOUNTER — PATIENT OUTREACH (OUTPATIENT)
Dept: PRIMARY CARE | Facility: CLINIC | Age: 64
End: 2024-11-06
Payer: COMMERCIAL

## 2024-11-12 LAB
CALCOFLUOR WHITE SPEC: ABNORMAL
FUNGUS SPEC CULT: ABNORMAL

## 2024-11-16 DIAGNOSIS — I10 ESSENTIAL (PRIMARY) HYPERTENSION: ICD-10-CM

## 2024-11-26 ENCOUNTER — APPOINTMENT (OUTPATIENT)
Dept: PRIMARY CARE | Facility: CLINIC | Age: 64
End: 2024-11-26
Payer: COMMERCIAL

## 2024-11-26 ENCOUNTER — LAB (OUTPATIENT)
Dept: LAB | Facility: LAB | Age: 64
End: 2024-11-26
Payer: COMMERCIAL

## 2024-11-26 VITALS — BODY MASS INDEX: 28.03 KG/M2 | DIASTOLIC BLOOD PRESSURE: 80 MMHG | SYSTOLIC BLOOD PRESSURE: 120 MMHG | WEIGHT: 201 LBS

## 2024-11-26 DIAGNOSIS — I10 ESSENTIAL (PRIMARY) HYPERTENSION: ICD-10-CM

## 2024-11-26 DIAGNOSIS — E55.9 VITAMIN D DEFICIENCY: ICD-10-CM

## 2024-11-26 DIAGNOSIS — T50.905A DRUG-INDUCED PHOTOSENSITIVITY: ICD-10-CM

## 2024-11-26 DIAGNOSIS — Z00.00 HEALTH CARE MAINTENANCE: ICD-10-CM

## 2024-11-26 DIAGNOSIS — E55.9 VITAMIN D DEFICIENCY: Primary | ICD-10-CM

## 2024-11-26 DIAGNOSIS — B35.3 TINEA PEDIS OF LEFT FOOT: ICD-10-CM

## 2024-11-26 DIAGNOSIS — L56.8 DRUG-INDUCED PHOTOSENSITIVITY: ICD-10-CM

## 2024-11-26 LAB
25(OH)D3 SERPL-MCNC: 28 NG/ML (ref 30–100)
ALBUMIN SERPL BCP-MCNC: 4.7 G/DL (ref 3.4–5)
ALP SERPL-CCNC: 64 U/L (ref 33–136)
ALT SERPL W P-5'-P-CCNC: 13 U/L (ref 10–52)
ANION GAP SERPL CALC-SCNC: 12 MMOL/L (ref 10–20)
AST SERPL W P-5'-P-CCNC: 13 U/L (ref 9–39)
BILIRUB SERPL-MCNC: 1.4 MG/DL (ref 0–1.2)
BUN SERPL-MCNC: 17 MG/DL (ref 6–23)
CALCIUM SERPL-MCNC: 9.8 MG/DL (ref 8.6–10.6)
CHLORIDE SERPL-SCNC: 101 MMOL/L (ref 98–107)
CHOLEST SERPL-MCNC: 188 MG/DL (ref 0–199)
CHOLESTEROL/HDL RATIO: 2.4
CO2 SERPL-SCNC: 30 MMOL/L (ref 21–32)
CREAT SERPL-MCNC: 0.68 MG/DL (ref 0.5–1.3)
EGFRCR SERPLBLD CKD-EPI 2021: >90 ML/MIN/1.73M*2
ERYTHROCYTE [DISTWIDTH] IN BLOOD BY AUTOMATED COUNT: 13.4 % (ref 11.5–14.5)
EST. AVERAGE GLUCOSE BLD GHB EST-MCNC: 105 MG/DL
GLUCOSE SERPL-MCNC: 105 MG/DL (ref 74–99)
HBA1C MFR BLD: 5.3 %
HCT VFR BLD AUTO: 45.5 % (ref 41–52)
HDLC SERPL-MCNC: 76.8 MG/DL
HGB BLD-MCNC: 14.7 G/DL (ref 13.5–17.5)
LDLC SERPL CALC-MCNC: 90 MG/DL
MCH RBC QN AUTO: 29.9 PG (ref 26–34)
MCHC RBC AUTO-ENTMCNC: 32.3 G/DL (ref 32–36)
MCV RBC AUTO: 93 FL (ref 80–100)
NON HDL CHOLESTEROL: 111 MG/DL (ref 0–149)
NRBC BLD-RTO: 0 /100 WBCS (ref 0–0)
PLATELET # BLD AUTO: 257 X10*3/UL (ref 150–450)
POTASSIUM SERPL-SCNC: 5 MMOL/L (ref 3.5–5.3)
PROT SERPL-MCNC: 7.2 G/DL (ref 6.4–8.2)
RBC # BLD AUTO: 4.91 X10*6/UL (ref 4.5–5.9)
SODIUM SERPL-SCNC: 138 MMOL/L (ref 136–145)
TRIGL SERPL-MCNC: 108 MG/DL (ref 0–149)
TSH SERPL-ACNC: 1.48 MIU/L (ref 0.44–3.98)
VLDL: 22 MG/DL (ref 0–40)
WBC # BLD AUTO: 6.9 X10*3/UL (ref 4.4–11.3)

## 2024-11-26 PROCEDURE — 99214 OFFICE O/P EST MOD 30 MIN: CPT | Performed by: STUDENT IN AN ORGANIZED HEALTH CARE EDUCATION/TRAINING PROGRAM

## 2024-11-26 PROCEDURE — 85027 COMPLETE CBC AUTOMATED: CPT

## 2024-11-26 PROCEDURE — 80061 LIPID PANEL: CPT

## 2024-11-26 PROCEDURE — 82306 VITAMIN D 25 HYDROXY: CPT

## 2024-11-26 PROCEDURE — 84443 ASSAY THYROID STIM HORMONE: CPT

## 2024-11-26 PROCEDURE — 83036 HEMOGLOBIN GLYCOSYLATED A1C: CPT

## 2024-11-26 PROCEDURE — 3079F DIAST BP 80-89 MM HG: CPT | Performed by: STUDENT IN AN ORGANIZED HEALTH CARE EDUCATION/TRAINING PROGRAM

## 2024-11-26 PROCEDURE — 3074F SYST BP LT 130 MM HG: CPT | Performed by: STUDENT IN AN ORGANIZED HEALTH CARE EDUCATION/TRAINING PROGRAM

## 2024-11-26 PROCEDURE — 36415 COLL VENOUS BLD VENIPUNCTURE: CPT

## 2024-11-26 PROCEDURE — 80053 COMPREHEN METABOLIC PANEL: CPT

## 2024-11-26 RX ORDER — METOPROLOL SUCCINATE 50 MG/1
50 TABLET, EXTENDED RELEASE ORAL DAILY
Qty: 90 TABLET | Refills: 1 | Status: SHIPPED | OUTPATIENT
Start: 2024-11-26

## 2024-11-26 RX ORDER — LOSARTAN POTASSIUM 100 MG/1
100 TABLET ORAL DAILY
Qty: 90 TABLET | Refills: 1 | Status: SHIPPED | OUTPATIENT
Start: 2024-11-26

## 2024-11-26 RX ORDER — LOSARTAN POTASSIUM 100 MG/1
100 TABLET ORAL DAILY
Qty: 90 TABLET | Refills: 1 | OUTPATIENT
Start: 2024-11-26

## 2024-11-26 RX ORDER — METOPROLOL SUCCINATE 50 MG/1
50 TABLET, EXTENDED RELEASE ORAL DAILY
Qty: 90 TABLET | Refills: 1 | OUTPATIENT
Start: 2024-11-26

## 2024-11-26 ASSESSMENT — ENCOUNTER SYMPTOMS
CARDIOVASCULAR NEGATIVE: 1
NEUROLOGICAL NEGATIVE: 1
CONSTITUTIONAL NEGATIVE: 1
GASTROINTESTINAL NEGATIVE: 1
PSYCHIATRIC NEGATIVE: 1
RESPIRATORY NEGATIVE: 1
MUSCULOSKELETAL NEGATIVE: 1

## 2024-11-26 ASSESSMENT — PATIENT HEALTH QUESTIONNAIRE - PHQ9
2. FEELING DOWN, DEPRESSED OR HOPELESS: NOT AT ALL
SUM OF ALL RESPONSES TO PHQ9 QUESTIONS 1 AND 2: 0
1. LITTLE INTEREST OR PLEASURE IN DOING THINGS: NOT AT ALL

## 2024-11-26 NOTE — PROGRESS NOTES
Subjective   Patient ID: Jenaro Belcher is a 64 y.o. male.    Patient seen in routine follow-up.  Regards that he is overall doing well, and states he does need follow-up for med refills.  Regards that he recently had knee surgery.  Is recovering from this, however is having some decreased range of motion.  He endorses that he was taking pain medications and did have a drug sensitivity reaction, otherwise, states no additional issues or concerns.  Reaction stopped after taking the medications.    Med Refill        Review of Systems   Constitutional: Negative.    HENT: Negative.     Respiratory: Negative.     Cardiovascular: Negative.    Gastrointestinal: Negative.    Musculoskeletal: Negative.    Skin: Negative.    Neurological: Negative.    Psychiatric/Behavioral: Negative.         Objective Visit Vitals  /80   Wt 91.2 kg (201 lb)   BMI 28.03 kg/m²   Smoking Status Former   BSA 2.14 m²      Physical Exam  Constitutional:       General: He is not in acute distress.     Appearance: He is not ill-appearing.   Eyes:      Pupils: Pupils are equal, round, and reactive to light.   Cardiovascular:      Rate and Rhythm: Normal rate and regular rhythm.      Pulses: Normal pulses.      Heart sounds: No murmur heard.  Pulmonary:      Effort: No respiratory distress.      Breath sounds: No wheezing.   Abdominal:      General: Abdomen is flat. Bowel sounds are normal. There is no distension.   Musculoskeletal:      Right lower leg: No edema.      Left lower leg: No edema.   Skin:     General: Skin is warm and dry.   Neurological:      Mental Status: He is alert. Mental status is at baseline.      Cranial Nerves: No cranial nerve deficit.      Motor: No weakness.   Psychiatric:         Mood and Affect: Mood normal.         Behavior: Behavior normal.         Assessment/Plan   Diagnoses and all orders for this visit:  Vitamin D deficiency  -     Vitamin D 25-Hydroxy,Total (for eval of Vitamin D levels); Future  Tinea pedis of  left foot  Essential (primary) hypertension  -     losartan (Cozaar) 100 mg tablet; Take 1 tablet (100 mg) by mouth once daily.  -     metoprolol succinate XL (Toprol-XL) 50 mg 24 hr tablet; Take 1 tablet (50 mg) by mouth once daily.  -     CBC; Future  -     Comprehensive Metabolic Panel; Future  -     Hemoglobin A1C; Future  -     TSH with reflex to Free T4 if abnormal; Future  -     Lipid panel; Future  Health care maintenance  -     CBC; Future  -     Comprehensive Metabolic Panel; Future  -     Hemoglobin A1C; Future  -     TSH with reflex to Free T4 if abnormal; Future  -     Lipid panel; Future  -     Vitamin D 25-Hydroxy,Total (for eval of Vitamin D levels); Future  Drug-induced photosensitivity        Patient presents for routine follow-up and other complaints     #Upper respiratory infection  #Otitis media  No issues at this time     #Chronic cough  #Exposure to asbestos  Recommend chest x-ray today     #Leg cramps  #Hypertension  Continue current regimen well-controlled refill medications today    #Drug sensitivity reaction  Noted after surgical procedure, avoidance of opioids in the future     #Pickett's esophagus  Continue PPI twice daily changed to tablets per patient request     #Back pain  #Degenerative disc disease  Has followed with physical therapy with regards to this, did see neurosurgery with regards to this, he was cleared for therapy, monitor     #Bilateral carpal tunnel syndrome  Follows with orthopedic underwent surgical fixation, still having issues with the right side monitor     #abdominal cramping  #pancreatic cysts  Follows with GI as well as genetics due to family history of pancreatic cancer, follow-up on routine testing, overall stable has a planned MRI in the future     #PMR  Follows with rheumatology     #Health maintenance  Lab work today routine testing      Recently had colonoscopy overall stable, advised age-appropriate vaccinations     Return to care in 6 months or prn

## 2024-12-02 ENCOUNTER — APPOINTMENT (OUTPATIENT)
Dept: PODIATRY | Facility: CLINIC | Age: 64
End: 2024-12-02
Payer: COMMERCIAL

## 2024-12-02 ASSESSMENT — ENCOUNTER SYMPTOMS
DIZZINESS: 0
DYSURIA: 0
SHORTNESS OF BREATH: 0
AGITATION: 0
DIFFICULTY URINATING: 0
DIARRHEA: 0
FEVER: 0
COLOR CHANGE: 0
NAUSEA: 0
VOMITING: 0
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
## 2024-12-03 ENCOUNTER — APPOINTMENT (OUTPATIENT)
Dept: INTEGRATIVE MEDICINE | Facility: CLINIC | Age: 64
End: 2024-12-03
Payer: COMMERCIAL

## 2024-12-03 DIAGNOSIS — M54.17 LUMBOSACRAL RADICULOPATHY AT L5: ICD-10-CM

## 2024-12-03 DIAGNOSIS — M54.2 CERVICALGIA: ICD-10-CM

## 2024-12-03 DIAGNOSIS — M79.10 MYALGIA, UNSPECIFIED SITE: ICD-10-CM

## 2024-12-03 DIAGNOSIS — M99.04 SACROILIAC JOINT SOMATIC DYSFUNCTION: ICD-10-CM

## 2024-12-03 DIAGNOSIS — M99.03 SOMATIC DYSFUNCTION OF LUMBAR REGION: Primary | ICD-10-CM

## 2024-12-03 DIAGNOSIS — M99.01 SOMATIC DYSFUNCTION OF CERVICAL REGION: ICD-10-CM

## 2024-12-03 DIAGNOSIS — M99.02 SOMATIC DYSFUNCTION OF THORACIC REGION: ICD-10-CM

## 2024-12-03 PROCEDURE — 97140 MANUAL THERAPY 1/> REGIONS: CPT | Performed by: CHIROPRACTOR

## 2024-12-03 PROCEDURE — 98941 CHIROPRACT MANJ 3-4 REGIONS: CPT | Performed by: CHIROPRACTOR

## 2024-12-10 ENCOUNTER — PATIENT MESSAGE (OUTPATIENT)
Dept: PRIMARY CARE | Facility: CLINIC | Age: 64
End: 2024-12-10
Payer: COMMERCIAL

## 2024-12-10 DIAGNOSIS — N52.9 ERECTILE DYSFUNCTION, UNSPECIFIED ERECTILE DYSFUNCTION TYPE: Primary | ICD-10-CM

## 2024-12-12 RX ORDER — VARDENAFIL HYDROCHLORIDE 10 MG/1
10 TABLET ORAL DAILY PRN
Qty: 10 TABLET | Refills: 0 | Status: SHIPPED | OUTPATIENT
Start: 2024-12-12 | End: 2025-01-11

## 2024-12-19 ENCOUNTER — OFFICE VISIT (OUTPATIENT)
Dept: PRIMARY CARE | Facility: CLINIC | Age: 64
End: 2024-12-19
Payer: COMMERCIAL

## 2024-12-19 VITALS
WEIGHT: 202.5 LBS | BODY MASS INDEX: 28.35 KG/M2 | SYSTOLIC BLOOD PRESSURE: 154 MMHG | DIASTOLIC BLOOD PRESSURE: 96 MMHG | HEART RATE: 57 BPM | OXYGEN SATURATION: 96 % | HEIGHT: 71 IN | RESPIRATION RATE: 20 BRPM

## 2024-12-19 DIAGNOSIS — I10 PRIMARY HYPERTENSION: Primary | ICD-10-CM

## 2024-12-19 PROCEDURE — 3077F SYST BP >= 140 MM HG: CPT | Performed by: STUDENT IN AN ORGANIZED HEALTH CARE EDUCATION/TRAINING PROGRAM

## 2024-12-19 PROCEDURE — 99213 OFFICE O/P EST LOW 20 MIN: CPT | Performed by: STUDENT IN AN ORGANIZED HEALTH CARE EDUCATION/TRAINING PROGRAM

## 2024-12-19 PROCEDURE — 1036F TOBACCO NON-USER: CPT | Performed by: STUDENT IN AN ORGANIZED HEALTH CARE EDUCATION/TRAINING PROGRAM

## 2024-12-19 PROCEDURE — 3008F BODY MASS INDEX DOCD: CPT | Performed by: STUDENT IN AN ORGANIZED HEALTH CARE EDUCATION/TRAINING PROGRAM

## 2024-12-19 PROCEDURE — 3080F DIAST BP >= 90 MM HG: CPT | Performed by: STUDENT IN AN ORGANIZED HEALTH CARE EDUCATION/TRAINING PROGRAM

## 2024-12-19 RX ORDER — HYDROCHLOROTHIAZIDE 12.5 MG/1
12.5 TABLET ORAL DAILY
Qty: 30 TABLET | Refills: 5 | Status: SHIPPED | OUTPATIENT
Start: 2024-12-19 | End: 2025-06-17

## 2024-12-19 ASSESSMENT — COLUMBIA-SUICIDE SEVERITY RATING SCALE - C-SSRS: 1. IN THE PAST MONTH, HAVE YOU WISHED YOU WERE DEAD OR WISHED YOU COULD GO TO SLEEP AND NOT WAKE UP?: NO

## 2024-12-19 ASSESSMENT — PAIN SCALES - GENERAL: PAINLEVEL_OUTOF10: 0-NO PAIN

## 2024-12-19 NOTE — PROGRESS NOTES
Subjective   Patient ID: Jenaro Belcher is a 64 y.o. male who presents for Dizziness and Hypertension.  HPI  Patient present to the office for dizziness and high blood pressure readings at home. Patient states he checks his BP once a week and measurements fluctuate. This morning, patient awoke feeling lightheaded around 5:00 am. He took his medications and then measured his BP and got a reading of 170/100. He remeasured twice before taking another dose of his blood pressure medications around 7:00 am. Patient reports feeling light headed and denies room spinning sensation, visual changes, dysequilibrium, fevers, chills or any other complaint at this time. He denies nausea, weakness, changes in sensation, changes in vision. Patient admits to swelling bilateral legs with right worse than left due to right knee replacement. He denies SOB or chest pain.     Review of Systems  12-point ROS was reviewed and is negative, unless otherwise noted in HPI    Objective   Vitals:    12/19/24 1516   BP: (!) 154/96   Pulse: 57   Resp: 20   SpO2: 96%      Physical Exam  GEN: alert, conversant, NAD  HEENT: PERRL, EOMI, MMM  NECK: supple, no LAD appreciated  CHEST: CTAB  CV: S1, S2, RRR, no murmurs appreciated  ABD: soft, NT, ND  EXT: no significant LE edema  SKIN: warm, dry  Neuro:    Assessment/Plan   #primary hypertension  -losartan (Cozaar) 100 mg tablet; Take 1 tablet (100 mg) by mouth once daily.  -Currently on metoprolol succinate XL (Toprol-XL) 50 mg 24 hr tablet.  Will discontinue.   - Will add 12.5 mg Hydrochlorothiazide, SE profile discussed. Repeat RFP in 5-7 days.  -Advised hydration and stress management      hSanon Yeager DO PGY-1 Internal Medicine    Trainee role: Resident    I saw and evaluated the patient. I personally obtained the key and critical portions of the history and physical exam or was physically present for key and critical portions performed by the trainee. I reviewed the trainee's documentation and  discussed the patient with the trainee. I agree with the trainee's medical decision making as documented on the trainee's notes.    Tyrone Hardy, DO

## 2024-12-20 DIAGNOSIS — I10 PRIMARY HYPERTENSION: ICD-10-CM

## 2024-12-26 DIAGNOSIS — I10 PRIMARY HYPERTENSION: Primary | ICD-10-CM

## 2024-12-26 RX ORDER — HYDROCHLOROTHIAZIDE 12.5 MG/1
12.5 TABLET ORAL DAILY
Qty: 180 TABLET | Refills: 0 | OUTPATIENT
Start: 2024-12-26 | End: 2025-06-24

## 2024-12-26 RX ORDER — METOPROLOL SUCCINATE 50 MG/1
50 TABLET, EXTENDED RELEASE ORAL DAILY
Qty: 90 TABLET | Refills: 3 | Status: SHIPPED | OUTPATIENT
Start: 2024-12-26 | End: 2025-12-26

## 2024-12-26 NOTE — TELEPHONE ENCOUNTER
Pt started hydrochlorothiazide, stopped on tues very nauseas and dizzy. Pt would like to restart metoprolol.  Would like sent to [facility]

## 2025-02-03 ASSESSMENT — ENCOUNTER SYMPTOMS
AGITATION: 0
DYSURIA: 0
DIARRHEA: 0
FEVER: 0
COLOR CHANGE: 0
NAUSEA: 0
SHORTNESS OF BREATH: 0
DIFFICULTY URINATING: 0
DIZZINESS: 0
VOMITING: 0
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
## 2025-02-04 ENCOUNTER — APPOINTMENT (OUTPATIENT)
Dept: INTEGRATIVE MEDICINE | Facility: CLINIC | Age: 65
End: 2025-02-04
Payer: COMMERCIAL

## 2025-02-04 DIAGNOSIS — M79.10 MYALGIA, UNSPECIFIED SITE: ICD-10-CM

## 2025-02-04 DIAGNOSIS — M99.04 SACROILIAC JOINT SOMATIC DYSFUNCTION: ICD-10-CM

## 2025-02-04 DIAGNOSIS — M99.03 SOMATIC DYSFUNCTION OF LUMBAR REGION: Primary | ICD-10-CM

## 2025-02-04 DIAGNOSIS — M54.17 LUMBOSACRAL RADICULOPATHY AT L5: ICD-10-CM

## 2025-02-04 DIAGNOSIS — M99.02 SOMATIC DYSFUNCTION OF THORACIC REGION: ICD-10-CM

## 2025-02-04 DIAGNOSIS — M54.2 CERVICALGIA: ICD-10-CM

## 2025-02-04 DIAGNOSIS — M99.01 SOMATIC DYSFUNCTION OF CERVICAL REGION: ICD-10-CM

## 2025-02-04 PROCEDURE — 98941 CHIROPRACT MANJ 3-4 REGIONS: CPT | Performed by: CHIROPRACTOR

## 2025-02-04 PROCEDURE — 97112 NEUROMUSCULAR REEDUCATION: CPT | Performed by: CHIROPRACTOR

## 2025-02-05 ENCOUNTER — OFFICE VISIT (OUTPATIENT)
Dept: PODIATRY | Facility: CLINIC | Age: 65
End: 2025-02-05
Payer: COMMERCIAL

## 2025-02-05 ENCOUNTER — APPOINTMENT (OUTPATIENT)
Dept: PODIATRY | Facility: CLINIC | Age: 65
End: 2025-02-05
Payer: COMMERCIAL

## 2025-02-05 DIAGNOSIS — M79.672 LEFT FOOT PAIN: ICD-10-CM

## 2025-02-05 DIAGNOSIS — B35.1 ONYCHOMYCOSIS: ICD-10-CM

## 2025-02-05 DIAGNOSIS — L85.3 XEROSIS CUTIS: ICD-10-CM

## 2025-02-05 DIAGNOSIS — Z79.899 HIGH RISK MEDICATION USE: Primary | ICD-10-CM

## 2025-02-05 DIAGNOSIS — B35.3 TINEA PEDIS OF LEFT FOOT: ICD-10-CM

## 2025-02-05 PROCEDURE — 99214 OFFICE O/P EST MOD 30 MIN: CPT | Performed by: PODIATRIST

## 2025-02-05 PROCEDURE — 1036F TOBACCO NON-USER: CPT | Performed by: PODIATRIST

## 2025-02-05 NOTE — PROGRESS NOTES
Chief Complaint   Patient presents with    Foot Callouses     F/U       Follow-up left foot.  Seems to be doing much better.  Using urea with occlusion.  Denies itching with the feet.  Has not been using the Dremel.  Admits to dry skin around the heels.  No other complaints at this time.  .   Recent right knee surgery.   Nondiabetic.  Non-smoker.    Review of systems negative except as noted above.      Medications and allergies reviewed.    General/Constitutional: Alert. NAD.   Respiratory: Non labored breathing.   Psychiatric: Mood and affect normal/baseline.   HEENT: Sclera clear. Wearing corrective lenses.  Dermatologic: Multiple dystrophic nail left foot.  Subungual debris noted.  This includes the first and second digits.  Webspaces are dry.  Prior tinea pedis significantly improved.  Skin has improved.  No fissures noted.  No acute inflammation.  No discomfort.  No scaly skin.    Vascular: Pedal pulses are intact and symmetric including the dorsalis pedis and the posterior tibial pulses. Feet are warm to touch. No swelling appreciated either extremity.  Neurological: Alert and oriented. No acute distress. No sensory impairment bilateral.  Monofilament testing normal.  Musculoskeletal: Strength is normal for age. No acute deficits appreciated.  Has brace on the left knee.  Compression on the right knee.  Nail culture Trichophyton rubrum.      Impression: Painful hyperkeratotic tissue left foot, tinea pedis.  Onychomycosis.    -Today's treatment and course of therapy was discussed with the patient in detail. Patient's questions were answered. Proper foot care was discussed. This dictation was done using Dragon computer software and as such may contain grammatical errors.    -Will order AST and ALT.  Provide this is normal I will order the Lamisil 250 mg daily x 3 months for you.  Precautions on medicine given.    -Counseled patient on proper footcare.  I would still encourage using ketoconazole cream  periodically or over-the-counter antifungal such as Lamisil.  Okay to spray your shoes with an antifungal spray.    -Discussed lab results.    -Counseled patient on proper footcare.    -Follow-up in 5 to 6 weeks.

## 2025-02-06 LAB
ALT SERPL-CCNC: 13 U/L (ref 9–46)
AST SERPL-CCNC: 13 U/L (ref 10–35)

## 2025-02-10 DIAGNOSIS — B35.1 ONYCHOMYCOSIS: Primary | ICD-10-CM

## 2025-02-10 RX ORDER — TERBINAFINE HYDROCHLORIDE 250 MG/1
250 TABLET ORAL DAILY
Qty: 30 TABLET | Refills: 2 | Status: SHIPPED | OUTPATIENT
Start: 2025-02-10 | End: 2025-05-05

## 2025-02-21 DIAGNOSIS — N52.9 ERECTILE DYSFUNCTION, UNSPECIFIED ERECTILE DYSFUNCTION TYPE: ICD-10-CM

## 2025-02-21 RX ORDER — VARDENAFIL HYDROCHLORIDE 10 MG/1
10 TABLET ORAL DAILY PRN
Qty: 10 TABLET | Refills: 0 | Status: SHIPPED | OUTPATIENT
Start: 2025-02-21 | End: 2025-03-23

## 2025-03-10 ASSESSMENT — ENCOUNTER SYMPTOMS
COLOR CHANGE: 0
NAUSEA: 0
FEVER: 0
DYSURIA: 0
VOMITING: 0
DIFFICULTY URINATING: 0
SHORTNESS OF BREATH: 0
DIARRHEA: 0
AGITATION: 0
DIZZINESS: 0

## 2025-03-11 ENCOUNTER — APPOINTMENT (OUTPATIENT)
Dept: INTEGRATIVE MEDICINE | Facility: CLINIC | Age: 65
End: 2025-03-11
Payer: COMMERCIAL

## 2025-03-11 DIAGNOSIS — M25.551 PAIN IN RIGHT HIP: ICD-10-CM

## 2025-03-11 DIAGNOSIS — M99.01 SOMATIC DYSFUNCTION OF CERVICAL REGION: ICD-10-CM

## 2025-03-11 DIAGNOSIS — M79.10 MYALGIA, UNSPECIFIED SITE: ICD-10-CM

## 2025-03-11 DIAGNOSIS — M99.04 SACROILIAC JOINT SOMATIC DYSFUNCTION: ICD-10-CM

## 2025-03-11 DIAGNOSIS — M54.2 CERVICALGIA: ICD-10-CM

## 2025-03-11 DIAGNOSIS — M99.02 SOMATIC DYSFUNCTION OF THORACIC REGION: ICD-10-CM

## 2025-03-11 DIAGNOSIS — M54.17 LUMBOSACRAL RADICULOPATHY AT L5: ICD-10-CM

## 2025-03-11 DIAGNOSIS — M99.03 SOMATIC DYSFUNCTION OF LUMBAR REGION: Primary | ICD-10-CM
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
## 2025-03-30 ENCOUNTER — APPOINTMENT (OUTPATIENT)
Dept: URGENT CARE | Age: 65
End: 2025-03-30
Payer: COMMERCIAL

## 2025-03-31 ENCOUNTER — HOSPITAL ENCOUNTER (EMERGENCY)
Facility: HOSPITAL | Age: 65
Discharge: HOME | End: 2025-03-31
Attending: STUDENT IN AN ORGANIZED HEALTH CARE EDUCATION/TRAINING PROGRAM
Payer: COMMERCIAL

## 2025-03-31 ENCOUNTER — PATIENT MESSAGE (OUTPATIENT)
Dept: PRIMARY CARE | Facility: CLINIC | Age: 65
End: 2025-03-31
Payer: COMMERCIAL

## 2025-03-31 VITALS
SYSTOLIC BLOOD PRESSURE: 170 MMHG | RESPIRATION RATE: 16 BRPM | HEART RATE: 70 BPM | BODY MASS INDEX: 27.3 KG/M2 | WEIGHT: 195 LBS | OXYGEN SATURATION: 96 % | DIASTOLIC BLOOD PRESSURE: 97 MMHG | HEIGHT: 71 IN | TEMPERATURE: 98.6 F

## 2025-03-31 DIAGNOSIS — K11.5 CALCULUS OF PAROTID GLAND: Primary | ICD-10-CM

## 2025-03-31 DIAGNOSIS — K11.5 PAROTID SIALOLITHIASIS: Primary | ICD-10-CM

## 2025-03-31 PROCEDURE — 99281 EMR DPT VST MAYX REQ PHY/QHP: CPT | Performed by: STUDENT IN AN ORGANIZED HEALTH CARE EDUCATION/TRAINING PROGRAM

## 2025-03-31 ASSESSMENT — COLUMBIA-SUICIDE SEVERITY RATING SCALE - C-SSRS
6. HAVE YOU EVER DONE ANYTHING, STARTED TO DO ANYTHING, OR PREPARED TO DO ANYTHING TO END YOUR LIFE?: NO
1. IN THE PAST MONTH, HAVE YOU WISHED YOU WERE DEAD OR WISHED YOU COULD GO TO SLEEP AND NOT WAKE UP?: NO
2. HAVE YOU ACTUALLY HAD ANY THOUGHTS OF KILLING YOURSELF?: NO

## 2025-03-31 ASSESSMENT — PAIN DESCRIPTION - ORIENTATION: ORIENTATION: LEFT

## 2025-03-31 ASSESSMENT — PAIN DESCRIPTION - LOCATION: LOCATION: THROAT

## 2025-03-31 ASSESSMENT — PAIN DESCRIPTION - PAIN TYPE: TYPE: ACUTE PAIN

## 2025-03-31 ASSESSMENT — PAIN - FUNCTIONAL ASSESSMENT: PAIN_FUNCTIONAL_ASSESSMENT: 0-10

## 2025-03-31 ASSESSMENT — PAIN SCALES - GENERAL: PAINLEVEL_OUTOF10: 9

## 2025-03-31 NOTE — ED TRIAGE NOTES
63 y/o male states he has a blocked salivary gland on left side. Patient states this has occurred before. Patient states he was seen yesterday at Urgent care and given antibiotic but pain is worsening. Patient  states he has taken nothing for pain prior to arrival

## 2025-03-31 NOTE — ED PROVIDER NOTES
History of Present Illness     History provided by: Patient  Limitations to History: None  External Records Reviewed with Brief Summary: None    HPI:  Jenaro Belcher is a 64 y.o. male with a past medical history of sialolithiasis who presents with sialolithiasis.  Patient notes that he has had 2 days of a salivary gland stone.  States he has been trying to eat sour candies with improvement.  No redness or fevers.  No pus.  He is concerned as he has a wedding that he is flying to tomorrow and does not want his face to get swollen from it.  He had went to an urgent care who would start the patient on antibiotics.    Physical Exam   Triage vitals:  T 37 °C (98.6 °F)  HR 70  BP (!) 170/97  RR 16  O2 96 % None (Room air)    General: Well appearing and in no acute distress.  HEENT: NCAT. PERRL. Oropharynx pink and moist. Mild left parotid swelling. There is a small palpable salivary duct stone present. No erythema or induration.  CV: Regular rate, regular rhythm.  Resp: Normal effort.   GI: Soft.   Neuro: Moving all extremities bilaterally.  Psych: Appropriate mood and affect    Medical Decision Making & ED Course   Medical Decision Making:  This is a 64 y.o. male with a past medical history of sialolithiasis who presents with sialolithiasis.  Patient had 2 days of a salivary duct stone.  Concern is he is awaiting soon once in the past.  He is already eating sour candies and was given antibiotics.  On exam he has mild swelling of his left buccal surface.  This consistent with a parotid salivary duct stone.  I was able to palpate it and try to massage the stone out but was unsuccessful.  Did advise patient on symptomatic control.  Advised on return precautions and discharge.  ----    Differential diagnoses considered include but are not limited to: salivary duct stone, parotitis, cellulitis     Social Determinants of Health which Significantly Impact Care: None identified     EKG Independent Interpretation: EKG not  obtained    Independent Result Review and Interpretation: None obtained    Chronic conditions affecting the patient's care: As documented above in [facility]    The patient was discussed with the following consultants/services: None    Care Considerations: As documented above in [facility]    ED Course:  Diagnoses as of 03/31/25 4786   Parotid sialolithiasis     Disposition   As a result of the work-up, the patient was discharged home.  he was informed of his diagnosis and instructed to come back with any concerns or worsening of condition.  he and was agreeable to the plan as discussed above.  he was given the opportunity to ask questions.  All of the patient's questions were answered.    Jenaro Villeda MD  Emergency Medicine     Jenaro Villeda MD  03/31/25 1127

## 2025-04-15 ENCOUNTER — APPOINTMENT (OUTPATIENT)
Dept: INTEGRATIVE MEDICINE | Facility: CLINIC | Age: 65
End: 2025-04-15
Payer: COMMERCIAL

## 2025-04-17 ENCOUNTER — APPOINTMENT (OUTPATIENT)
Facility: CLINIC | Age: 65
End: 2025-04-17
Payer: COMMERCIAL

## 2025-04-21 ENCOUNTER — APPOINTMENT (OUTPATIENT)
Dept: PRIMARY CARE | Facility: CLINIC | Age: 65
End: 2025-04-21
Payer: COMMERCIAL

## 2025-04-21 ENCOUNTER — HOSPITAL ENCOUNTER (OUTPATIENT)
Dept: RADIOLOGY | Facility: CLINIC | Age: 65
Discharge: HOME | End: 2025-04-21
Payer: COMMERCIAL

## 2025-04-21 VITALS
SYSTOLIC BLOOD PRESSURE: 122 MMHG | BODY MASS INDEX: 27.06 KG/M2 | OXYGEN SATURATION: 96 % | HEART RATE: 70 BPM | TEMPERATURE: 98 F | WEIGHT: 194 LBS | DIASTOLIC BLOOD PRESSURE: 83 MMHG

## 2025-04-21 DIAGNOSIS — R19.5 LOOSE STOOLS: ICD-10-CM

## 2025-04-21 DIAGNOSIS — R06.09 DYSPNEA ON EXERTION: Primary | ICD-10-CM

## 2025-04-21 DIAGNOSIS — Z09 S/P APPENDECTOMY, FOLLOW-UP EXAM: ICD-10-CM

## 2025-04-21 DIAGNOSIS — R06.09 DYSPNEA ON EXERTION: ICD-10-CM

## 2025-04-21 PROCEDURE — 71046 X-RAY EXAM CHEST 2 VIEWS: CPT

## 2025-04-21 PROCEDURE — 3074F SYST BP LT 130 MM HG: CPT | Performed by: STUDENT IN AN ORGANIZED HEALTH CARE EDUCATION/TRAINING PROGRAM

## 2025-04-21 PROCEDURE — 71046 X-RAY EXAM CHEST 2 VIEWS: CPT | Performed by: RADIOLOGY

## 2025-04-21 PROCEDURE — 3079F DIAST BP 80-89 MM HG: CPT | Performed by: STUDENT IN AN ORGANIZED HEALTH CARE EDUCATION/TRAINING PROGRAM

## 2025-04-21 PROCEDURE — 74018 RADEX ABDOMEN 1 VIEW: CPT

## 2025-04-21 PROCEDURE — 74018 RADEX ABDOMEN 1 VIEW: CPT | Performed by: RADIOLOGY

## 2025-04-21 PROCEDURE — 99214 OFFICE O/P EST MOD 30 MIN: CPT | Performed by: STUDENT IN AN ORGANIZED HEALTH CARE EDUCATION/TRAINING PROGRAM

## 2025-04-21 NOTE — PROGRESS NOTES
Subjective   Patient ID: Jenaro Belcher is a 64 y.o. male who presents for Post-op Problem.  HPI  Jenaro is here for acute visit/hospital follow up.    Was recently in [facility] and was found to have acute appendicitis with rupture 4/11/25-4/16/25. At the time he was a river cruise.  He was started on pain medications on the boat. He had a CT scan which showed acute appendicitis with rupture.  He had a laparoscopic appendectomy. Surgery was 9 days ago. He has been having short of breath with exertion, does report a slight improvement in the last 2 days.  He has had a poor appetite, some slight improvement in the last 2 days. He has been having loose stools, ~2 per day. Non-bloody. No fevers, chills, nausea. Has been having some abdominal discomfort/cramping with eating.    Review of Systems  12-point ROS was reviewed and is negative, unless otherwise noted in HPI    Objective   Vitals:    04/21/25 1206   BP: 122/83   Pulse: 70   Temp: 36.7 °C (98 °F)   SpO2: 96%      Physical Exam  GEN: alert, conversant, NAD  HEENT: PERRL, EOMI, MMM  NECK: supple, no LAD appreciated  CHEST: CTAB  CV: S1, S2, RRR, no murmurs appreciated  ABD: soft, NT, hypoactive bowel sounds. 4 well approximated surgical scars without evidence of infection  EXT: no significant LE edema  SKIN: warm, dry    Assessment/Plan   #Recent acute appendicitis w/ rupture s/p laparoscopic appendectomy 4/11/25  #ileus, suspected  #dyspnea on exertion  -[place] on vacation in [facility] 9 days ago. 4 sutures removed in the office today  -Recommend KUB to r/o obstruction, CXR to r/o atelectasis/pleural effusion  - discussed signs/symptoms that would be concerning for infection, obstruction, etc    #primary hypertension, well controlled  -losartan (Cozaar) 100 mg tablet; Take 1 tablet (100 mg) by mouth once daily.  -Currently on metoprolol succinate XL (Toprol-XL) 50 mg 24 hr tablet.  -Advised hydration and stress management      RTC as scheduled, sooner PRN     Denis Oconnor DO     Trainee role: Resident    I saw and evaluated the patient. I personally obtained the key and critical portions of the history and physical exam or was physically present for key and critical portions performed by the trainee. I reviewed the trainee's documentation and discussed the patient with the trainee. I agree with the trainee's medical decision making as documented on the trainee's notes.    Tyrone Hardy, DO

## 2025-04-23 ENCOUNTER — TELEPHONE (OUTPATIENT)
Dept: PRIMARY CARE | Facility: CLINIC | Age: 65
End: 2025-04-23
Payer: COMMERCIAL

## 2025-04-23 NOTE — TELEPHONE ENCOUNTER
Result Communication    Resulted Orders   XR abdomen 1 view    Narrative    Interpreted By:  Jas Kumar,   STUDY:  XR ABDOMEN 1 VIEW; 4/21/2025 1:33 pm      INDICATION:  Signs/Symptoms:recent appendectomy, loose stool.      COMPARISON:  09/20/2022      ACCESSION NUMBER(S):  [id number]      ORDERING CLINICIAN:  GALLITO RUELAS      FINDINGS:  2 supine AP radiographs of the abdomen were obtained. Multiple  prominent air-filled loops of small bowel are seen over the left mid  abdomen, with stool and air seen throughout the colon. This may  represent ileus or developing obstruction. Free intraperitoneal air  and air-fluid levels cannot be excluded without upright or decubitus  images.        Impression    Dilated air-filled loops of small bowel over the left mid abdomen, as  above. Clinical correlation and continued follow-up is recommended.      MACRO:  None      Signed by: Jas Kumar 4/22/2025 6:00 PM  Dictation workstation:   [id number]       9:13 AM    Called to discuss imaging results. Abdominal discomfort and appetite have improved. Having regular bowel movements. No nausea. Instructed on warning signs to watch for possible developing obstruction, currently no clinical sign of obstruction. Clinical status is improving.

## 2025-04-24 ENCOUNTER — APPOINTMENT (OUTPATIENT)
Facility: CLINIC | Age: 65
End: 2025-04-24
Payer: COMMERCIAL

## 2025-04-24 VITALS
BODY MASS INDEX: 27.16 KG/M2 | SYSTOLIC BLOOD PRESSURE: 131 MMHG | WEIGHT: 194 LBS | HEIGHT: 71 IN | DIASTOLIC BLOOD PRESSURE: 84 MMHG

## 2025-04-24 DIAGNOSIS — K11.5 CALCULUS OF PAROTID GLAND: ICD-10-CM

## 2025-04-24 PROCEDURE — 3075F SYST BP GE 130 - 139MM HG: CPT | Performed by: OTOLARYNGOLOGY

## 2025-04-24 PROCEDURE — 3079F DIAST BP 80-89 MM HG: CPT | Performed by: OTOLARYNGOLOGY

## 2025-04-24 PROCEDURE — 3008F BODY MASS INDEX DOCD: CPT | Performed by: OTOLARYNGOLOGY

## 2025-04-24 PROCEDURE — 99203 OFFICE O/P NEW LOW 30 MIN: CPT | Performed by: OTOLARYNGOLOGY

## 2025-04-24 PROCEDURE — 1036F TOBACCO NON-USER: CPT | Performed by: OTOLARYNGOLOGY

## 2025-04-24 ASSESSMENT — PAIN SCALES - GENERAL: PAINLEVEL_OUTOF10: 5

## 2025-04-24 NOTE — PROGRESS NOTES
Impression:  Recurrent left parotid sialoadenitis with evidence of intraparotid/parotid duct stone    RECOMMENDATIONS/PLAN :  I reassured the patient there is no evidence of any residual infection today however I want him to massage the gland multiple times daily to encourage salivary flow.  He will continue to drink plenty of water and keep himself well-hydrated and he can suck on some sour candy which will also encourage salivary flow.  I would like to refer him to Dr. Ronen Chisholm at [facility] because he specializes in salivary gland problems and may be able to perform a sialendoscopy and remove the stone and he will also probably need dilation of his left parotid duct as well.  The patient agrees to the above plan.      **This electronic medical record note was created with the use of voice recognition software.  Despite proofreading, typographical or grammatical errors may be present that could affect meaning of content **    Subjective   Patient ID:     Jenaro Belcher is a 64 y.o. male who presents to the office today complaining that he has had intermittent swelling of his left parotid gland on and off for many years.  The patient states that he can feel a large stone moving back-and-forth near the opening of the parotid duct on that left side.  He was recently given oral antibiotics and he was subsequently hospitalized in [facility] with an appendix problem and received IV antibiotics and now he no longer has any swelling to the gland itself.  He can still feel the stone moving around with some tenderness.  No recent fever or chills.  No problems on the right side.    ROS:  A detailed 12 system review of systems is noted on the intake form has been reviewed with the patient with details noted in the HPI and scanned into the patient's medical record.    Objective     Medical History[1]     Surgical History[2]     RX Allergies[3]     Current Medications[4]     Tobacco Use: Medium Risk (4/24/2025)    Patient  "History     Smoking Tobacco Use: Former     Smokeless Tobacco Use: Former     Passive Exposure: Not on file        Alcohol Use: Not on file        Social History     Substance and Sexual Activity   Drug Use Yes    Types: Marijuana    Comment: occasional gummy        Physical Exam:  Visit Vitals  /84 (BP Location: Right arm)   Ht 1.803 m (5' 11\")   Wt 88 kg (194 lb)   BMI 27.06 kg/m²   Smoking Status Former   BSA 2.1 m²      General: Patient is alert, oriented, cooperative in no apparent distress.  Head: Normocephalic, atraumatic.  Eyes: PERRL, EOMI, Conjunctiva is clear. No nystagmus.  Ears: Right Ear-- Pinna is normal.  External auditory canal is patent. Tympanic membrane is [intact, translucent and has good mobility with my pneumatic otoscope. No effusion].  Mastoid is nontender.  Left ear-- Pinna is normal.  External auditory canal is patent. Tympanic membrane is [intact, translucent and has good mobility with my pneumatic otoscope.  No effusion].  Mastoid is nontender.  Nose: Septum is relatively straight.  No septal perforation or lesions. No septal hematoma/ seroma.  No signs of bleeding.  Inferior turbinates are normal.   No evidence of intranasal polyps.  No infectious drainage.  Throat:  Floor of mouth is clear, no masses.  Tongue appears normal, no lesions or masses. Gums, gingiva, buccal mucosa appear pink and moist, no lesions. Teeth are in fair repair.  No obvious dental infections.  Peritonsillar regions appear symmetric without swelling.  Hard and soft palate appear normal, no obvious cleft. Uvula is midline.  Stensen's duct left-- minimal scar tissue at the opening of the duct however I can palpate a larger stone further up along the duct.  No pus draining from the duct.  No obvious stone at the duct orifice.  Oropharynx: No lesions. Retropharyngeal wall is flat.  No active postnasal drip.  Neck: Supple,  no lymphadenopathy.  No masses.  Salivary Glands: Symmetric bilaterally.  No palpable " masses.  No evidence of acute infection or salivary stones  Neurologic: Cranial Nerves 2-12 are grossly intact without focal deficits. Cerebellar function testing is normal.     Results:   []    Procedure:   []    Main Robins DO        [1]   Past Medical History:  Diagnosis Date    Personal history of other diseases of the circulatory system     History of hypertension   [2]   Past Surgical History:  Procedure Laterality Date    COLONOSCOPY  02/19/2014    Complete Colonoscopy    ESOPHAGOGASTRODUODENOSCOPY  02/19/2014    Diagnostic Esophagogastroduodenoscopy    KNEE ARTHROPLASTY      OTHER SURGICAL HISTORY  01/06/2023    Shoulder surgery    OTHER SURGICAL HISTORY  11/12/2019    Knee surgery   [3]   Allergies  Allergen Reactions    Oxycodone Other     Severe nausea    Tapentadol Rash    Nitrofurantoin Macrocrystalline Hives   [4]   Current Outpatient Medications:     esomeprazole magnesium (NexIUM 24HR) 20 mg tablet,delayed release (DR/EC), Take 20 mg by mouth once daily. (Patient taking differently: Take 20 mg by mouth once daily. prn), Disp: 90 tablet, Rfl: 1    losartan (Cozaar) 100 mg tablet, Take 1 tablet (100 mg) by mouth once daily., Disp: 90 tablet, Rfl: 1    metoprolol succinate XL (Toprol-XL) 50 mg 24 hr tablet, Take 1 tablet (50 mg) by mouth once daily. Do not crush or chew., Disp: 90 tablet, Rfl: 3    desipramine (Norpramin) 10 mg tablet, Take 1 tablet (10 mg) by mouth once daily at bedtime. (Patient not taking: Reported on 4/24/2025), Disp: 90 tablet, Rfl: 0    ketoconazole (NIZOral) 2 % cream, Apply once daily abdominal left foot. (Patient not taking: Reported on 4/24/2025), Disp: 30 g, Rfl: 1    omeprazole (PriLOSEC) 20 mg DR capsule, Take 1 capsule (20 mg) by mouth 2 times a day. (Patient not taking: Reported on 4/24/2025), Disp: , Rfl:     terbinafine (LamISIL) 250 mg tablet, Take 1 tablet (250 mg) by mouth once daily. (Patient not taking: Reported on 4/24/2025), Disp: 30 tablet, Rfl: 2     vardenafil (Levitra) 10 mg tablet, Take 1 tablet (10 mg) by mouth once daily as needed for erectile dysfunction., Disp: 10 tablet, Rfl: 0

## 2025-05-05 ENCOUNTER — APPOINTMENT (OUTPATIENT)
Dept: RADIOLOGY | Facility: HOSPITAL | Age: 65
End: 2025-05-05
Payer: COMMERCIAL

## 2025-05-05 ENCOUNTER — HOSPITAL ENCOUNTER (EMERGENCY)
Facility: HOSPITAL | Age: 65
Discharge: HOME | End: 2025-05-05
Attending: EMERGENCY MEDICINE
Payer: COMMERCIAL

## 2025-05-05 VITALS
DIASTOLIC BLOOD PRESSURE: 79 MMHG | RESPIRATION RATE: 16 BRPM | BODY MASS INDEX: 26.04 KG/M2 | WEIGHT: 186 LBS | HEIGHT: 71 IN | HEART RATE: 65 BPM | SYSTOLIC BLOOD PRESSURE: 135 MMHG | TEMPERATURE: 98.2 F | OXYGEN SATURATION: 99 %

## 2025-05-05 DIAGNOSIS — R10.9 ABDOMINAL PAIN, UNSPECIFIED ABDOMINAL LOCATION: Primary | ICD-10-CM

## 2025-05-05 LAB
ALBUMIN SERPL BCP-MCNC: 4.2 G/DL (ref 3.4–5)
ALP SERPL-CCNC: 58 U/L (ref 33–136)
ALT SERPL W P-5'-P-CCNC: 17 U/L (ref 10–52)
ANION GAP SERPL CALC-SCNC: 10 MMOL/L (ref 10–20)
APPEARANCE UR: CLEAR
AST SERPL W P-5'-P-CCNC: 15 U/L (ref 9–39)
BASOPHILS # BLD AUTO: 0.05 X10*3/UL (ref 0–0.1)
BASOPHILS NFR BLD AUTO: 0.9 %
BILIRUB SERPL-MCNC: 0.6 MG/DL (ref 0–1.2)
BILIRUB UR STRIP.AUTO-MCNC: NEGATIVE MG/DL
BUN SERPL-MCNC: 24 MG/DL (ref 6–23)
CALCIUM SERPL-MCNC: 9.2 MG/DL (ref 8.6–10.3)
CHLORIDE SERPL-SCNC: 105 MMOL/L (ref 98–107)
CO2 SERPL-SCNC: 26 MMOL/L (ref 21–32)
COLOR UR: COLORLESS
CREAT SERPL-MCNC: 0.67 MG/DL (ref 0.5–1.3)
EGFRCR SERPLBLD CKD-EPI 2021: >90 ML/MIN/1.73M*2
EOSINOPHIL # BLD AUTO: 0.16 X10*3/UL (ref 0–0.7)
EOSINOPHIL NFR BLD AUTO: 2.7 %
ERYTHROCYTE [DISTWIDTH] IN BLOOD BY AUTOMATED COUNT: 12 % (ref 11.5–14.5)
GLUCOSE SERPL-MCNC: 100 MG/DL (ref 74–99)
GLUCOSE UR STRIP.AUTO-MCNC: NORMAL MG/DL
HCT VFR BLD AUTO: 41.2 % (ref 41–52)
HGB BLD-MCNC: 13.5 G/DL (ref 13.5–17.5)
IMM GRANULOCYTES # BLD AUTO: 0.02 X10*3/UL (ref 0–0.7)
IMM GRANULOCYTES NFR BLD AUTO: 0.3 % (ref 0–0.9)
KETONES UR STRIP.AUTO-MCNC: NEGATIVE MG/DL
LEUKOCYTE ESTERASE UR QL STRIP.AUTO: NEGATIVE
LIPASE SERPL-CCNC: 42 U/L (ref 9–82)
LYMPHOCYTES # BLD AUTO: 1.78 X10*3/UL (ref 1.2–4.8)
LYMPHOCYTES NFR BLD AUTO: 30.5 %
MCH RBC QN AUTO: 31 PG (ref 26–34)
MCHC RBC AUTO-ENTMCNC: 32.8 G/DL (ref 32–36)
MCV RBC AUTO: 95 FL (ref 80–100)
MONOCYTES # BLD AUTO: 0.42 X10*3/UL (ref 0.1–1)
MONOCYTES NFR BLD AUTO: 7.2 %
NEUTROPHILS # BLD AUTO: 3.41 X10*3/UL (ref 1.2–7.7)
NEUTROPHILS NFR BLD AUTO: 58.4 %
NITRITE UR QL STRIP.AUTO: NEGATIVE
NRBC BLD-RTO: 0 /100 WBCS (ref 0–0)
PH UR STRIP.AUTO: 5.5 [PH]
PLATELET # BLD AUTO: 291 X10*3/UL (ref 150–450)
POTASSIUM SERPL-SCNC: 4.2 MMOL/L (ref 3.5–5.3)
PROT SERPL-MCNC: 6.9 G/DL (ref 6.4–8.2)
PROT UR STRIP.AUTO-MCNC: NEGATIVE MG/DL
RBC # BLD AUTO: 4.36 X10*6/UL (ref 4.5–5.9)
RBC # UR STRIP.AUTO: NEGATIVE MG/DL
SODIUM SERPL-SCNC: 137 MMOL/L (ref 136–145)
SP GR UR STRIP.AUTO: 1.02
UROBILINOGEN UR STRIP.AUTO-MCNC: NORMAL MG/DL
WBC # BLD AUTO: 5.8 X10*3/UL (ref 4.4–11.3)

## 2025-05-05 PROCEDURE — 36415 COLL VENOUS BLD VENIPUNCTURE: CPT | Performed by: EMERGENCY MEDICINE

## 2025-05-05 PROCEDURE — 80053 COMPREHEN METABOLIC PANEL: CPT | Performed by: EMERGENCY MEDICINE

## 2025-05-05 PROCEDURE — 83690 ASSAY OF LIPASE: CPT | Performed by: EMERGENCY MEDICINE

## 2025-05-05 PROCEDURE — 81003 URINALYSIS AUTO W/O SCOPE: CPT | Performed by: EMERGENCY MEDICINE

## 2025-05-05 PROCEDURE — 85025 COMPLETE CBC W/AUTO DIFF WBC: CPT | Performed by: EMERGENCY MEDICINE

## 2025-05-05 PROCEDURE — 74176 CT ABD & PELVIS W/O CONTRAST: CPT | Mod: FOREIGN READ | Performed by: RADIOLOGY

## 2025-05-05 PROCEDURE — 99284 EMERGENCY DEPT VISIT MOD MDM: CPT | Mod: 25 | Performed by: EMERGENCY MEDICINE

## 2025-05-05 PROCEDURE — 74176 CT ABD & PELVIS W/O CONTRAST: CPT

## 2025-05-05 ASSESSMENT — PAIN SCALES - GENERAL
PAINLEVEL_OUTOF10: 2
PAINLEVEL_OUTOF10: 5 - MODERATE PAIN

## 2025-05-05 ASSESSMENT — PAIN - FUNCTIONAL ASSESSMENT: PAIN_FUNCTIONAL_ASSESSMENT: 0-10

## 2025-05-05 NOTE — DISCHARGE INSTRUCTIONS
Please follow-up with a surgeon if you continue to have pain.  Please use Tylenol as an outpatient and return if you develop fevers worsening pain or you want to be reevaluated.  Please use MiraLAX as we talked about to help treat constipation.

## 2025-05-05 NOTE — ED TRIAGE NOTES
Pt presents to department from home with diffuse lower abd pain. Pt states he recently in [facility] when his appendix ruptured and had surgery in [facility] 3 weeks ago. Pt saw his PCP upon returning home and has been experiencing nausea, urinary symptoms and diffuse lower abd pain. CAMPBELLS

## 2025-05-05 NOTE — ED PROVIDER NOTES
"Limitations to History: None     HPI:      Jenaro Belcher is a 64 y.o. who presents to the ED with suprapubic and RLQ abdominal pain.  Patient was seen in [facility] and had an appendectomy after ruptured appendix approximately 3 weeks ago.  He also had some drains and was in the hospital on IV antibiotics for 5 days before being discharged.  Patient says since then has had worsening abdominal pain specifically suprapubically and somewhat in the right lower quadrant.  No fevers intermittent nausea and significant pain after urination, no burning during urination but he has a suprapubic abdominal pain after he empties his bladder.  Normal bowel movements..    ------------------------------------------------------------------------------------------------------------------------------------------    VS: /85 (BP Location: Right arm, Patient Position: Lying)   Pulse 58   Temp 36.8 °C (98.2 °F) (Temporal)   Resp 15   Ht 1.803 m (5' 11\")   Wt 84.4 kg (186 lb)   SpO2 100%   BMI 25.94 kg/m²     Physical Exam:  Gen: Alert, NAD  Head/Neck: NCAT, neck w/ FROM  Eyes: EOMI, PERRL, anicteric sclerae, noninjected conjunctivae  Mouth:  MMM, no OP lesions noted  Heart: RRR no MRG  Lungs: CTA b/l no RRW, no increased work of breathing  Abdomen: soft, tender to palpation suprapubically with some guarding but no rebound tenderness to palpation  Musculoskeletal: no joint swelling noted  Extremities: WWP, no c/c/e, cap refill <2sec  Neurologic: Alert, symmetrical facies, phonates clearly, moves all extremities equally, responsive to touch, ambulates normally       ------------------------------------------------------------------------------------------------------------------------------------------    Medical Decision Making: Patient presents 3 weeks after an appendectomy after ruptured appendicitis.  High suspicion for reaccumulation of the infection CT scan was obtained along with labs which does not show white count, " patient's exam is not particularly tender but does have some point tenderness. Patient CT scan does not show any acute infection or abnormality, white count is normal.  Discussed with the patient follow-up with his surgeon if he continues to have pain and to return if he develops fevers or worsening pain.  No evidence of urinary tract infection on exam or labs.    Diagnoses as of 05/05/25 0529   Abdominal pain, unspecified abdominal location       Medications - No data to display     Tyrone Hernandez MD  05/05/25 0506

## 2025-05-08 LAB
NON-UH HIE A/G RATIO: 1.2
NON-UH HIE ALB: 3.8 G/DL (ref 3.4–5)
NON-UH HIE ALK PHOS: 58 UNIT/L (ref 45–117)
NON-UH HIE BASO COUNT: 0.05 X1000 (ref 0–0.2)
NON-UH HIE BASOS %: 0.8 %
NON-UH HIE BILIRUBIN, TOTAL: 1 MG/DL (ref 0.3–1.2)
NON-UH HIE BUN/CREAT RATIO: 22.2
NON-UH HIE BUN: 20 MG/DL (ref 9–23)
NON-UH HIE CALCIUM: 9.9 MG/DL (ref 8.7–10.4)
NON-UH HIE CALCULATED OSMOLALITY: 284 MOSM/KG (ref 275–295)
NON-UH HIE CHLORIDE: 103 MMOL/L (ref 98–107)
NON-UH HIE CO2, VENOUS: 31 MMOL/L (ref 20–31)
NON-UH HIE CREATININE: 0.9 MG/DL (ref 0.6–1.1)
NON-UH HIE DIFF?: ABNORMAL
NON-UH HIE EOS COUNT: 0.11 X1000 (ref 0–0.5)
NON-UH HIE EOSIN %: 1.8 %
NON-UH HIE GFR AA: >60
NON-UH HIE GLOBULIN: 3.2 G/DL
NON-UH HIE GLOMERULAR FILTRATION RATE: >60 ML/MIN/1.73M?
NON-UH HIE GLUCOSE: 71 MG/DL (ref 74–106)
NON-UH HIE GOT: 13 UNIT/L (ref 15–37)
NON-UH HIE GPT: 15 UNIT/L (ref 10–49)
NON-UH HIE HCT: 40 % (ref 41–52)
NON-UH HIE HGB: 13.3 G/DL (ref 13.5–17.5)
NON-UH HIE INSTR WBC: 6.2
NON-UH HIE K: 4.2 MMOL/L (ref 3.5–5.1)
NON-UH HIE LYMPH %: 30.7 %
NON-UH HIE LYMPH COUNT: 1.9 X1000 (ref 1.2–4.8)
NON-UH HIE MCH: 30.8 PG (ref 27–34)
NON-UH HIE MCHC: 33.2 G/DL (ref 32–37)
NON-UH HIE MCV: 92.8 FL (ref 80–100)
NON-UH HIE MONO %: 5.9 %
NON-UH HIE MONO COUNT: 0.36 X1000 (ref 0.1–1)
NON-UH HIE MPV: 8.1 FL (ref 7.4–10.4)
NON-UH HIE NA: 142 MMOL/L (ref 135–145)
NON-UH HIE NEUTROPHIL %: 60.8 %
NON-UH HIE NEUTROPHIL COUNT (ANC): 3.76 X1000 (ref 1.4–8.8)
NON-UH HIE NUCLEATED RBC: 0 /100WBC
NON-UH HIE PLATELET: 299 X10 (ref 150–450)
NON-UH HIE RBC: 4.3 X10 (ref 4.7–6.1)
NON-UH HIE RDW: 12.9 % (ref 11.5–14.5)
NON-UH HIE TOTAL PROTEIN: 7.1 G/DL (ref 5.7–8.2)
NON-UH HIE WBC: 6.2 X10 (ref 4.5–11)

## 2025-05-15 ENCOUNTER — TELEPHONE (OUTPATIENT)
Dept: GASTROENTEROLOGY | Facility: CLINIC | Age: 65
End: 2025-05-15
Payer: COMMERCIAL

## 2025-05-15 NOTE — TELEPHONE ENCOUNTER
Received call from central scheduling that this patient is currently in [place] and just underwent appendectomy. [facility] will not release the patient until he has a follow up with Dr. Joshi? Advised the  at this time we do not do clearances from appendectomies. Patient should follow up with PCP.

## 2025-05-21 ENCOUNTER — APPOINTMENT (OUTPATIENT)
Dept: GASTROENTEROLOGY | Facility: CLINIC | Age: 65
End: 2025-05-21
Payer: COMMERCIAL

## 2025-05-21 ENCOUNTER — TELEPHONE (OUTPATIENT)
Dept: GASTROENTEROLOGY | Facility: CLINIC | Age: 65
End: 2025-05-21

## 2025-05-21 VITALS
HEART RATE: 60 BPM | SYSTOLIC BLOOD PRESSURE: 128 MMHG | BODY MASS INDEX: 25.79 KG/M2 | WEIGHT: 184.2 LBS | HEIGHT: 71 IN | DIASTOLIC BLOOD PRESSURE: 88 MMHG | OXYGEN SATURATION: 98 % | RESPIRATION RATE: 18 BRPM

## 2025-05-21 DIAGNOSIS — K86.89 MASS OF HEAD OF PANCREAS (HHS-HCC): ICD-10-CM

## 2025-05-21 DIAGNOSIS — K21.9 GASTROESOPHAGEAL REFLUX DISEASE WITHOUT ESOPHAGITIS: ICD-10-CM

## 2025-05-21 DIAGNOSIS — K56.600 PARTIAL INTESTINAL OBSTRUCTION, UNSPECIFIED CAUSE (MULTI): ICD-10-CM

## 2025-05-21 DIAGNOSIS — K59.09 CHRONIC CONSTIPATION WITH OVERFLOW: Primary | ICD-10-CM

## 2025-05-21 DIAGNOSIS — K86.2 CYST OF PANCREAS (HHS-HCC): ICD-10-CM

## 2025-05-21 PROCEDURE — 3074F SYST BP LT 130 MM HG: CPT

## 2025-05-21 PROCEDURE — 99214 OFFICE O/P EST MOD 30 MIN: CPT

## 2025-05-21 PROCEDURE — 3008F BODY MASS INDEX DOCD: CPT

## 2025-05-21 PROCEDURE — 3079F DIAST BP 80-89 MM HG: CPT

## 2025-05-21 PROCEDURE — 1036F TOBACCO NON-USER: CPT

## 2025-05-21 RX ORDER — ESOMEPRAZOLE MAGNESIUM 20 MG/1
20 TABLET, DELAYED RELEASE ORAL DAILY
Qty: 90 TABLET | Refills: 1 | Status: SHIPPED | OUTPATIENT
Start: 2025-05-21 | End: 2025-11-17

## 2025-05-21 ASSESSMENT — ENCOUNTER SYMPTOMS
PALPITATIONS: 0
MYALGIAS: 0
CHILLS: 0
JOINT SWELLING: 0
CHOKING: 0
VOICE CHANGE: 0
COLOR CHANGE: 0
CONFUSION: 0
BRUISES/BLEEDS EASILY: 0
ABDOMINAL DISTENTION: 1
FLANK PAIN: 0
SEIZURES: 0
ABDOMINAL PAIN: 1
BLOOD IN STOOL: 0
CONSTIPATION: 1
NAUSEA: 0
APPETITE CHANGE: 0
UNEXPECTED WEIGHT CHANGE: 0
WEAKNESS: 0
AGITATION: 0
TROUBLE SWALLOWING: 0
TREMORS: 0
LIGHT-HEADEDNESS: 0
DIZZINESS: 0
SHORTNESS OF BREATH: 0
RECTAL PAIN: 0
ANAL BLEEDING: 0
COUGH: 0
ARTHRALGIAS: 0
VOMITING: 0
NERVOUS/ANXIOUS: 0
HEMATURIA: 0
DIARRHEA: 0
FEVER: 0

## 2025-05-21 NOTE — TELEPHONE ENCOUNTER
"Patient was seen in office today by Cat for a follow up appointment from ED visit in [place]. Patient and his wife stopped back in to office after scheduling MRCP and CT. It was requested by wife that the orders be changed to STAT orders so that they could be done sooner. I checked with Cat and she said that there was nothing to warrant changing the orders to STAT status and that this was already explained to the patient and his wife. I advised them that the provider  would not be changing the order. Patient's wife replied: \" then why the fuck did we come in\". Patient's wife stated that he may need to return to the emergency department. Patient and wife then left the office.   "

## 2025-05-21 NOTE — PROGRESS NOTES
Subjective   Abdominal pain  Patient ID: Jenaro Belcher is a 64 y.o. male who presents for Abdominal Pain (Pt states he had emergency appendectomy in [facility] on 4/11/25. Pt then continued to have abdominal pain-went to ED on 05/04 and was advised to take miralax BID. Pt states bowel movements were very loose and watery. Pt then went to [place] and had to go to ED due to pain. Imaging showed extensive stool with possible high grade partial obstruction. Pt also has liver and pancreas lesion as well. ).        History of Present Illness:   Jenaro Belcher is a 64 y.o. male with a PMH of Pickett's esophagus, chronic constipation, GERD, hiatal hernia, pancreatic cyst who presents today for hospital FUV. Patient was recently seen in the ED 5/5/2025 for suprapubic and RLQ abdominal pain.  Patient was in [facility] had an appendectomy after ruptured appendix at that time, 3 weeks ag, in which she was hospitalized with IV antibiotics.  CT scan and lab work showed leukocytosis or any signs of acute infection or abnormality. Patient was ultimately discharged.  Patient then went to [place] and on 5/14 he began to have.  CT abdomen pelvis showing dilated loops of small bowel seen predominantly in the left and abdomen with fat stranding surrounding this region oral contrast mixed acute through this region and into the colon and into the descending colon is a moderate amount of stool in the rectum.  Findings suggestive of high-grade partial obstruction.  No suggestions of bowel ischemia at this time.  Contrast was able to pass through.  Patient was seen by surgery who deemed it unnecessary for interference at this time.  Liver findings showed hepatic lobe measuring 6.1 x 5.1 x 5.5 markable gallbladder and bile ducts no focal cystic mass in the pancreas or suggestion of pancreatitis.  Patient ended up having NG tube for decompression, CLD for 2 days with improvement and patient was ultimately discharged.  Patient has been following with GI  team since 2019, most recently with Dr. Palomo 2024 with last official visit in 2023 for pancreatic head cyst and GERD with possible Linx procedure with [facility] prior.  Patient has history of pancreatic cancer in first-degree relatives of father.  MRI/MRCP on 9/20/2022 showed 2 small lucent process cystic lesions measuring up to 0.6 cm normal main PD may represent sidebranch dilatations or sidebranch IPMN's.  Patient has been on a 5-year MRI once yearly schedule.  Patient's endoscopic ultrasound with FNA 9/19/2024 showing Pickett's esophagus, erosion of the stomach and antrum consistent with gastritis negative for H. pylori, pancreatic duct WNL, the parenchyma was visualized in the head of the pancreas, body of the pancreas and tail of the pancreas homogenous and hyperechoic with generalized hyperechoic strands, cyst measuring 6 mm x 5 mm with poorly defined margins not connected to the main PD with no nodules or masses noted.  No dilation of CBD.  1 round homogenous anechoic and locular cyst measuring 15 mm x 40 mm with well-defined and smooth margins in the liver.  Plan at that time was to discuss the case at multidisciplinary team meeting.  Patient has not followed with advanced endoscopy since.  Patient is due for colonoscopy as well as last colonoscopy was 2022 showing a polyp in the rectum.  Last EGD same year for Pickett's surveillance.  Patient is on chronic esomeprazole 20 mg daily.    Upon today's visit patient states that he has been not maintaining a appropriate diet.  He has been intermittently eating steak which is what triggered his symptoms in [place].  Additionally, since his ED visit in the beginning of the month he has been noncompliant with constipation bowel regimen of 2 capfuls of MiraLAX daily.  He has stopped taking that.  He states his bowel movements are rare.  He has had liquid stool urinary with no full relief and evacuation.  He continues to have mid abdominal pain without N/V, rectal pain,  hematochezia, melena, mucus.  No upper GI complaints at this time.    LOV 12/11/23  Jenaro Belcher is a 63 y.o. male with a past medical history of hypertension and Pickett's esophagus and chronic GERD being evaluated in the office for persistent symptoms of GERD.  Has followed with Dr. Joshi in the past chronically.  Discussing possible Linx procedure for his chronic GERD symptoms.  He describes partial response to acid suppressive therapy even on high doses with the use of omeprazole 40 mg as well as esomeprazole in the past.  No dysphagia or dyne aphasia.  Most recent upper endoscopy done about 15 months ago with short segment Pickett's esophagus.  No reflux esophagitis otherwise.  Has 2 cm hiatal hernia.  He describes episodes every few weeks where he has persistent discomfort in the chest with tingling sensation that also goes out to his arms and legs.  This is not positional when this happens and can last many hours or several days.   Patient with chronic GERD as well as small hiatal hernia and short segment nondysplastic Pickett's esophagus.  Having episodes that do not respond to acid suppressive therapy with discomfort in the chest with burning and tingling sensation.  I suspect element of functional heartburn and visceral hypersensitivity.  I did recommend he continue acid suppressive therapy with his Pickett's history.  I did suggest trial of low-dose desipramine for these episodes to see if that helps control these and his chronic nausea.  He would like to give it a try.  He will consider seeing surgeons to discuss Lynx if he does not have response with this approach.      LOV 9/21/2023  #63-year-old male with worsening GERD and upper abdominal pain. History of Pickett's esophagus with a 2 cm hiatal hernia. No red flag symptoms noted, but patient also has pancreatic cyst versus IPMN. He is scheduled soon for a surveillance MRI of the pancreas. Symptoms could be multifactorial in nature, GERD, IBS,  functional, visceral hypersensitivity, pancreas, musculoskeletal, constipation?     -Proceed with MRI of the pancreas for surveillance, follow-up pending results  -Decrease omeprazole to 20 mg daily, since you can tolerate this dose better.  -Needs lifelong PPI due to Pickett's esophagus  -Antireflux lifestyle reiterated  -He can continue Mylanta as needed or he can try FDgard samples providedâ€“4 boxes  -We will discuss possible Linx device with Dr. Joshi to see if this patient is a candidate. POC pending.     *Chart Update  Reviewed case with Dr. Joshi. Recommends [facility] prior to sending patient for possible LINX.   Patient aware. order in place.         LOV 3/27/2023  1. Pancreatic cyst seen on MRI MRCP/family history of pancreatic cancer in first-degree at age of 60s (father). No alarming sign like abdominal pain, anorexia or weight loss. Recent genetic test, did not show clinically known remarkable. mutation. He is less likely benefit from screening as per guideline (A 1st-degree relative (parents, siblings, and children) AND a gene mutation OR Someone with two 1st-degree relatives with pancreatic cancer  MRI/MRCP on 09/20222 showed: Two small uncinate process cystic lesions measuring up to 0.6 cm. Normal main PD. which may represent side branch dilatations or side branch IPMNs. Attention on follow-up pancreatic MRI VERSUS EUS in 12 months, or is patient denies history of pancreatitis     Will schedule him for EUS of pancreas on 09/2023  He will benefit from EGD for persistent epigastric and RUQ pain to rule out PUD        9/19/2024 endoscopic ultrasound with FNA  Findings  ? C2M2 Pickett's esophagus observed with no associated lesion without using a distal attachment cap. The Z-line was 34 cm from the incisors and the top of gastric folds was 40 cm from the incisors. 0 islands were noted; electronic chromoendoscopy (NBI) was used; performed four-quadrant cold forceps biopsy every 1 cm  ? Moderate, patchy  edematous and erythematous mucosa with erosion in the body of the stomach and antrum, consistent with gastritis; performed cold forceps biopsy to rule out H. pylori  ? The duodenal bulb and 2nd part of the duodenum appeared normal.  ? The parenchyma was visualized in the head of the pancreas, body of the pancreas and tail of the pancreas. The parenchyma was homogeneous and hyperechoic and had generalized hyperechoic strands. The pancreatic duct appeared normal and measured 2 mm at the head and 1 mm at the body.  ? One round, homogeneous, anechoic and septated cyst measuring 6 mm x 5 mm with poorly-defined margins. The cyst is not connected to main PD. No nodule or mass was noted  ? The bile duct appeared normal and had no dilation, stricture, stones, plugs, sludge or wall thickening. The bile duct measured 3 mm at the distal end.  ? One round, homogeneous, anechoic and unilocular cyst measuring 50 mm x 40 mm with well-defined and smooth margins in the liver.  ? The parenchyma of the liver appeared normal. The parenchyma was visualized in the left lobe of the liver. The parenchyma was homogeneous. The hepatic ducts appeared normal.  ? The major papilla appeared normal.  ? The left kidney, spleen, splenic hilum and left adrenal appeared normal.        Recommendation  Will discuss the case at multidisciplinary team meeting     EGD 8/25/2022  Impression:     - Esophageal mucosal changes classified as Pickett's                          stage C1-M2 per Dawson criteria. Biopsied.                         - 2 cm hiatal hernia.                         - Normal examined duodenum.  Recommendation:        - Patient has a contact number available for                          emergencies. The signs and symptoms of potential                          delayed complications were discussed with the patient.                          Return to normal activities tomorrow. Written                          discharge instructions were  provided to the patient.                         - Resume previous diet.                         - Continue present medications.                         - Await pathology results.                         - Return to GI clinic.    Colonoscopy 8/25/2022  Impression:     - The examined portion of the ileum was normal.                         - External and internal hemorrhoids.                         - One 15 mm polyp in the rectum, removed with a hot                          snare. Resected and retrieved. Clip was placed.  Recommendation:        - Patient has a contact number available for                          emergencies. The signs and symptoms of potential                          delayed complications were discussed with the patient.                          Return to normal activities tomorrow. Written                          discharge instructions were provided to the patient.                         - Low residue diet for 2 days.                         - Continue present medications.                         - No aspirin, ibuprofen, naproxen, or other                          non-steroidal anti-inflammatory drugs for 7 days after                          polyp removal.                         - Await pathology results.                         - Repeat colonoscopy in 3 years for surveillance.                         - Return to GI clinic as previously scheduled.    MRCP 8/27/2024  IMPRESSION:  1.  Previously-seen cystic lesions involving the pancreatic head  which have slightly increased in size, largest now measuring up to 12  mm, previously measuring up to 7 mm as above.  2. Hepatomegaly with redemonstration of a dominant hepatic cysts  which appears morphologically stable.  3. Remaining findings appear stable since comparison MRI imaging  10/11/2023 as above.        Abdominal x-ray 4/21/2025  IMPRESSION:  Dilated air-filled loops of small bowel over the left mid abdomen, as  above. Clinical correlation  and continued follow-up is recommended.      CT abdomen pelvis 5/5/2025  IMPRESSION:  1.Cholelithiasis.  2.Multiple liver and renal cysts.  Signed by Connor Mora MD    Review of Systems  ROS Negative unless otherwise stated above.    Past Medical/Surgical History  Medical History[1]   Surgical History[2]     Social History   reports that he has quit smoking. His smoking use included cigarettes. He has quit using smokeless tobacco. He reports current alcohol use. He reports current drug use. Drug: Marijuana.     Family History  family history is not on file.     Allergies  RX Allergies[3]    Medications  Current Outpatient Medications   Medication Instructions    desipramine (NORPRAMIN) 10 mg, oral, Nightly    esomeprazole magnesium (NEXIUM 24HR) 20 mg, oral, Daily    ketoconazole (NIZOral) 2 % cream Apply once daily abdominal left foot.    losartan (COZAAR) 100 mg, oral, Daily    metoprolol succinate XL (TOPROL-XL) 50 mg, oral, Daily, Do not crush or chew.    omeprazole (PriLOSEC) 20 mg DR capsule 1 capsule, 2 times daily    vardenafil (LEVITRA) 10 mg, oral, Daily PRN          Review of Systems   Constitutional:  Negative for appetite change, chills, fever and unexpected weight change.   HENT:  Negative for mouth sores, nosebleeds, trouble swallowing and voice change.    Respiratory:  Negative for cough, choking and shortness of breath.    Cardiovascular:  Negative for chest pain, palpitations and leg swelling.   Gastrointestinal:  Positive for abdominal distention, abdominal pain and constipation. Negative for anal bleeding, blood in stool, diarrhea, nausea, rectal pain and vomiting.   Genitourinary:  Negative for flank pain and hematuria.   Musculoskeletal:  Negative for arthralgias, joint swelling and myalgias.   Skin:  Negative for color change and rash.   Allergic/Immunologic: Negative for environmental allergies, food allergies and immunocompromised state.   Neurological:  Negative for dizziness, tremors,  "seizures, syncope, weakness and light-headedness.   Hematological:  Does not bruise/bleed easily.   Psychiatric/Behavioral:  Negative for agitation and confusion. The patient is not nervous/anxious.        Objective   Physical Exam  Vitals reviewed.   Constitutional:       Appearance: Normal appearance. He is normal weight.   HENT:      Head: Normocephalic and atraumatic.      Nose: Nose normal.      Mouth/Throat:      Mouth: Mucous membranes are moist.   Eyes:      Pupils: Pupils are equal, round, and reactive to light.   Cardiovascular:      Rate and Rhythm: Normal rate.   Pulmonary:      Effort: Pulmonary effort is normal.      Breath sounds: Normal breath sounds.   Abdominal:      General: Bowel sounds are normal.      Palpations: Abdomen is soft.   Musculoskeletal:         General: Normal range of motion.      Cervical back: Normal range of motion.   Skin:     General: Skin is warm and dry.      Capillary Refill: Capillary refill takes less than 2 seconds.   Neurological:      Mental Status: He is alert and oriented to person, place, and time.       /88 (BP Location: Right arm, Patient Position: Sitting, BP Cuff Size: Large adult)   Pulse 60   Resp 18   Ht 1.803 m (5' 11\")   Wt 83.6 kg (184 lb 3.2 oz)   SpO2 98%   BMI 25.69 kg/m²      Lab Results   Component Value Date    WBC 5.8 05/05/2025    HGB 13.5 05/05/2025    HCT 41.2 05/05/2025    MCV 95 05/05/2025     05/05/2025           No lab exists for component: \"LABALBU\"    No results found for: \"AFP\"  Lab Results   Component Value Date    TSH 1.48 11/26/2024         Assessment/Plan   Jenaro Belcher is a 64 y.o. male with a PMH of Pickett's esophagus, chronic constipation, GERD, hiatal hernia, pancreatic cyst who presents today for hospital FU.  Patient presents with a constellation of GI concerns.  Recommend patient follow-up with  and advanced endoscopy to further assess his past endoscopic ultrasound with FNA findings, MRCP.  Will " order MRCP now to keep on patient's yearly schedule pancreatic cyst.  Additionally, will obtain patient's omeprazole 20 mg daily for Pickett's esophagus.  To further assess patient's partial bowel obstruction at this time we will order CT abdomen pelvis to further assess his constipation. Basic labs as well.   Recommended to follow-up with advanced endoscopy for further treatment plan of care. He will make an apt with Dr. Palomo prior to leaving today.  Recommend adequate bowel rest diet with mostly liquid consumption.     Diagnoses and all orders for this visit:  Chronic constipation with overflow  -     CT angio abdomen pelvis w and or wo IV IV contrast; Future  -     CBC; Future  -     Comprehensive metabolic panel; Future  Partial intestinal obstruction, unspecified cause (Multi)  -     CT angio abdomen pelvis w and or wo IV IV contrast; Future  Mass of head of pancreas (HHS-HCC)  -     MRCP pancreas w and wo IV contrast; Future  -     CBC; Future  -     Comprehensive metabolic panel; Future  Cyst of pancreas (HHS-HCC)  -     MRCP pancreas w and wo IV contrast; Future  Gastroesophageal reflux disease without esophagitis    Nanda BRYAN CNP              [1]   Past Medical History:  Diagnosis Date    Personal history of other diseases of the circulatory system     History of hypertension   [2]   Past Surgical History:  Procedure Laterality Date    COLONOSCOPY  02/19/2014    Complete Colonoscopy    ESOPHAGOGASTRODUODENOSCOPY  02/19/2014    Diagnostic Esophagogastroduodenoscopy    KNEE ARTHROPLASTY      OTHER SURGICAL HISTORY  01/06/2023    Shoulder surgery    OTHER SURGICAL HISTORY  11/12/2019    Knee surgery   [3]   Allergies  Allergen Reactions    Oxycodone Other     Severe nausea    Tapentadol Rash    Nitrofurantoin Macrocrystalline Hives

## 2025-05-22 LAB
ALBUMIN SERPL-MCNC: 4.5 G/DL (ref 3.6–5.1)
ALP SERPL-CCNC: 63 U/L (ref 35–144)
ALT SERPL-CCNC: 9 U/L (ref 9–46)
ANION GAP SERPL CALCULATED.4IONS-SCNC: 5 MMOL/L (CALC) (ref 7–17)
AST SERPL-CCNC: 11 U/L (ref 10–35)
BILIRUB SERPL-MCNC: 1 MG/DL (ref 0.2–1.2)
BUN SERPL-MCNC: 15 MG/DL (ref 7–25)
CALCIUM SERPL-MCNC: 9.8 MG/DL (ref 8.6–10.3)
CHLORIDE SERPL-SCNC: 102 MMOL/L (ref 98–110)
CO2 SERPL-SCNC: 31 MMOL/L (ref 20–32)
CREAT SERPL-MCNC: 0.73 MG/DL (ref 0.7–1.35)
EGFRCR SERPLBLD CKD-EPI 2021: 103 ML/MIN/1.73M2
ERYTHROCYTE [DISTWIDTH] IN BLOOD BY AUTOMATED COUNT: 11.9 % (ref 11–15)
GLUCOSE SERPL-MCNC: 94 MG/DL (ref 65–99)
HCT VFR BLD AUTO: 41.2 % (ref 38.5–50)
HGB BLD-MCNC: 13.5 G/DL (ref 13.2–17.1)
MCH RBC QN AUTO: 30.8 PG (ref 27–33)
MCHC RBC AUTO-ENTMCNC: 32.8 G/DL (ref 32–36)
MCV RBC AUTO: 93.8 FL (ref 80–100)
PLATELET # BLD AUTO: 324 THOUSAND/UL (ref 140–400)
PMV BLD REES-ECKER: 10.1 FL (ref 7.5–12.5)
POTASSIUM SERPL-SCNC: 5.1 MMOL/L (ref 3.5–5.3)
PROT SERPL-MCNC: 7.3 G/DL (ref 6.1–8.1)
RBC # BLD AUTO: 4.39 MILLION/UL (ref 4.2–5.8)
SODIUM SERPL-SCNC: 138 MMOL/L (ref 135–146)
WBC # BLD AUTO: 6.1 THOUSAND/UL (ref 3.8–10.8)

## 2025-05-28 ENCOUNTER — APPOINTMENT (OUTPATIENT)
Dept: RADIOLOGY | Facility: HOSPITAL | Age: 65
End: 2025-05-28
Payer: COMMERCIAL

## 2025-05-28 ENCOUNTER — APPOINTMENT (OUTPATIENT)
Dept: CARDIOLOGY | Facility: HOSPITAL | Age: 65
End: 2025-05-28
Payer: COMMERCIAL

## 2025-05-28 ENCOUNTER — TELEPHONE (OUTPATIENT)
Dept: GASTROENTEROLOGY | Facility: CLINIC | Age: 65
End: 2025-05-28
Payer: COMMERCIAL

## 2025-05-28 ENCOUNTER — DOCUMENTATION (OUTPATIENT)
Dept: GASTROENTEROLOGY | Facility: CLINIC | Age: 65
End: 2025-05-28
Payer: COMMERCIAL

## 2025-05-28 ENCOUNTER — HOSPITAL ENCOUNTER (INPATIENT)
Facility: HOSPITAL | Age: 65
LOS: 2 days | Discharge: HOME | End: 2025-05-30
Attending: STUDENT IN AN ORGANIZED HEALTH CARE EDUCATION/TRAINING PROGRAM | Admitting: INTERNAL MEDICINE
Payer: COMMERCIAL

## 2025-05-28 DIAGNOSIS — K59.00 CONSTIPATION, UNSPECIFIED CONSTIPATION TYPE: ICD-10-CM

## 2025-05-28 DIAGNOSIS — K56.609 SBO (SMALL BOWEL OBSTRUCTION) (MULTI): Primary | ICD-10-CM

## 2025-05-28 LAB
ALBUMIN SERPL BCP-MCNC: 4.4 G/DL (ref 3.4–5)
ALP SERPL-CCNC: 58 U/L (ref 33–136)
ALT SERPL W P-5'-P-CCNC: 11 U/L (ref 10–52)
ANION GAP SERPL CALC-SCNC: 13 MMOL/L (ref 10–20)
APPEARANCE UR: CLEAR
AST SERPL W P-5'-P-CCNC: 11 U/L (ref 9–39)
BASOPHILS # BLD AUTO: 0.03 X10*3/UL (ref 0–0.1)
BASOPHILS NFR BLD AUTO: 0.4 %
BILIRUB SERPL-MCNC: 1.3 MG/DL (ref 0–1.2)
BILIRUB UR STRIP.AUTO-MCNC: NEGATIVE MG/DL
BUN SERPL-MCNC: 14 MG/DL (ref 6–23)
CALCIUM SERPL-MCNC: 9.6 MG/DL (ref 8.6–10.3)
CHLORIDE SERPL-SCNC: 101 MMOL/L (ref 98–107)
CO2 SERPL-SCNC: 29 MMOL/L (ref 21–32)
COLOR UR: ABNORMAL
CREAT SERPL-MCNC: 0.69 MG/DL (ref 0.5–1.3)
EGFRCR SERPLBLD CKD-EPI 2021: >90 ML/MIN/1.73M*2
EOSINOPHIL # BLD AUTO: 0.11 X10*3/UL (ref 0–0.7)
EOSINOPHIL NFR BLD AUTO: 1.4 %
ERYTHROCYTE [DISTWIDTH] IN BLOOD BY AUTOMATED COUNT: 12.9 % (ref 11.5–14.5)
GLUCOSE SERPL-MCNC: 92 MG/DL (ref 74–99)
GLUCOSE UR STRIP.AUTO-MCNC: NORMAL MG/DL
HCT VFR BLD AUTO: 39.1 % (ref 41–52)
HGB BLD-MCNC: 13 G/DL (ref 13.5–17.5)
IMM GRANULOCYTES # BLD AUTO: 0.01 X10*3/UL (ref 0–0.7)
IMM GRANULOCYTES NFR BLD AUTO: 0.1 % (ref 0–0.9)
KETONES UR STRIP.AUTO-MCNC: NEGATIVE MG/DL
LACTATE SERPL-SCNC: 0.7 MMOL/L (ref 0.4–2)
LEUKOCYTE ESTERASE UR QL STRIP.AUTO: NEGATIVE
LIPASE SERPL-CCNC: 23 U/L (ref 9–82)
LYMPHOCYTES # BLD AUTO: 1.53 X10*3/UL (ref 1.2–4.8)
LYMPHOCYTES NFR BLD AUTO: 20 %
MAGNESIUM SERPL-MCNC: 2.16 MG/DL (ref 1.6–2.4)
MCH RBC QN AUTO: 31 PG (ref 26–34)
MCHC RBC AUTO-ENTMCNC: 33.2 G/DL (ref 32–36)
MCV RBC AUTO: 93 FL (ref 80–100)
MONOCYTES # BLD AUTO: 0.46 X10*3/UL (ref 0.1–1)
MONOCYTES NFR BLD AUTO: 6 %
NEUTROPHILS # BLD AUTO: 5.5 X10*3/UL (ref 1.2–7.7)
NEUTROPHILS NFR BLD AUTO: 72.1 %
NITRITE UR QL STRIP.AUTO: NEGATIVE
NRBC BLD-RTO: 0 /100 WBCS (ref 0–0)
PH UR STRIP.AUTO: 7.5 [PH]
PLATELET # BLD AUTO: 321 X10*3/UL (ref 150–450)
POTASSIUM SERPL-SCNC: 4.5 MMOL/L (ref 3.5–5.3)
PROT SERPL-MCNC: 6.8 G/DL (ref 6.4–8.2)
PROT UR STRIP.AUTO-MCNC: NEGATIVE MG/DL
RBC # BLD AUTO: 4.2 X10*6/UL (ref 4.5–5.9)
RBC # UR STRIP.AUTO: NEGATIVE MG/DL
SODIUM SERPL-SCNC: 138 MMOL/L (ref 136–145)
SP GR UR STRIP.AUTO: 1.05
UROBILINOGEN UR STRIP.AUTO-MCNC: NORMAL MG/DL
WBC # BLD AUTO: 7.6 X10*3/UL (ref 4.4–11.3)

## 2025-05-28 PROCEDURE — 1200000002 HC GENERAL ROOM WITH TELEMETRY DAILY

## 2025-05-28 PROCEDURE — 74018 RADEX ABDOMEN 1 VIEW: CPT

## 2025-05-28 PROCEDURE — 85025 COMPLETE CBC W/AUTO DIFF WBC: CPT

## 2025-05-28 PROCEDURE — 74177 CT ABD & PELVIS W/CONTRAST: CPT

## 2025-05-28 PROCEDURE — 74177 CT ABD & PELVIS W/CONTRAST: CPT | Performed by: STUDENT IN AN ORGANIZED HEALTH CARE EDUCATION/TRAINING PROGRAM

## 2025-05-28 PROCEDURE — 83690 ASSAY OF LIPASE: CPT

## 2025-05-28 PROCEDURE — 96372 THER/PROPH/DIAG INJ SC/IM: CPT

## 2025-05-28 PROCEDURE — 2500000004 HC RX 250 GENERAL PHARMACY W/ HCPCS (ALT 636 FOR OP/ED): Performed by: PHYSICIAN ASSISTANT

## 2025-05-28 PROCEDURE — 2500000001 HC RX 250 WO HCPCS SELF ADMINISTERED DRUGS (ALT 637 FOR MEDICARE OP)

## 2025-05-28 PROCEDURE — 2550000001 HC RX 255 CONTRASTS

## 2025-05-28 PROCEDURE — 81003 URINALYSIS AUTO W/O SCOPE: CPT

## 2025-05-28 PROCEDURE — 0D9670Z DRAINAGE OF STOMACH WITH DRAINAGE DEVICE, VIA NATURAL OR ARTIFICIAL OPENING: ICD-10-PCS

## 2025-05-28 PROCEDURE — 2500000004 HC RX 250 GENERAL PHARMACY W/ HCPCS (ALT 636 FOR OP/ED): Mod: JZ

## 2025-05-28 PROCEDURE — 99285 EMERGENCY DEPT VISIT HI MDM: CPT

## 2025-05-28 PROCEDURE — 83735 ASSAY OF MAGNESIUM: CPT | Performed by: STUDENT IN AN ORGANIZED HEALTH CARE EDUCATION/TRAINING PROGRAM

## 2025-05-28 PROCEDURE — 99285 EMERGENCY DEPT VISIT HI MDM: CPT | Mod: 25 | Performed by: STUDENT IN AN ORGANIZED HEALTH CARE EDUCATION/TRAINING PROGRAM

## 2025-05-28 PROCEDURE — 80053 COMPREHEN METABOLIC PANEL: CPT

## 2025-05-28 PROCEDURE — 83605 ASSAY OF LACTIC ACID: CPT

## 2025-05-28 PROCEDURE — 93005 ELECTROCARDIOGRAM TRACING: CPT

## 2025-05-28 PROCEDURE — 99236 HOSP IP/OBS SAME DATE HI 85: CPT | Performed by: PHYSICIAN ASSISTANT

## 2025-05-28 PROCEDURE — 74019 RADEX ABDOMEN 2 VIEWS: CPT | Performed by: RADIOLOGY

## 2025-05-28 PROCEDURE — 36415 COLL VENOUS BLD VENIPUNCTURE: CPT

## 2025-05-28 RX ORDER — KETOROLAC TROMETHAMINE 15 MG/ML
15 INJECTION, SOLUTION INTRAMUSCULAR; INTRAVENOUS ONCE
Status: COMPLETED | OUTPATIENT
Start: 2025-05-28 | End: 2025-05-28

## 2025-05-28 RX ORDER — PROCHLORPERAZINE EDISYLATE 5 MG/ML
10 INJECTION INTRAMUSCULAR; INTRAVENOUS EVERY 6 HOURS PRN
Status: DISCONTINUED | OUTPATIENT
Start: 2025-05-28 | End: 2025-05-30 | Stop reason: HOSPADM

## 2025-05-28 RX ORDER — METOPROLOL TARTRATE 1 MG/ML
5 INJECTION, SOLUTION INTRAVENOUS EVERY 6 HOURS
Status: DISCONTINUED | OUTPATIENT
Start: 2025-05-28 | End: 2025-05-30 | Stop reason: HOSPADM

## 2025-05-28 RX ORDER — DEXTROSE 50 % IN WATER (D50W) INTRAVENOUS SYRINGE
25
Status: DISCONTINUED | OUTPATIENT
Start: 2025-05-28 | End: 2025-05-30 | Stop reason: HOSPADM

## 2025-05-28 RX ORDER — DICYCLOMINE HYDROCHLORIDE 10 MG/ML
10 INJECTION INTRAMUSCULAR ONCE
Status: DISCONTINUED | OUTPATIENT
Start: 2025-05-28 | End: 2025-05-28

## 2025-05-28 RX ORDER — SODIUM CHLORIDE 9 MG/ML
75 INJECTION, SOLUTION INTRAVENOUS CONTINUOUS
Status: ACTIVE | OUTPATIENT
Start: 2025-05-28 | End: 2025-05-29

## 2025-05-28 RX ORDER — OMEPRAZOLE 10 MG/1
10 CAPSULE, DELAYED RELEASE ORAL EVERY OTHER DAY
COMMUNITY

## 2025-05-28 RX ORDER — METOPROLOL SUCCINATE 50 MG/1
50 TABLET, EXTENDED RELEASE ORAL DAILY
Status: DISCONTINUED | OUTPATIENT
Start: 2025-05-29 | End: 2025-05-30 | Stop reason: HOSPADM

## 2025-05-28 RX ORDER — DEXTROSE 50 % IN WATER (D50W) INTRAVENOUS SYRINGE
12.5
Status: DISCONTINUED | OUTPATIENT
Start: 2025-05-28 | End: 2025-05-30 | Stop reason: HOSPADM

## 2025-05-28 RX ORDER — DIPHENHYDRAMINE HYDROCHLORIDE 50 MG/ML
25 INJECTION, SOLUTION INTRAMUSCULAR; INTRAVENOUS NIGHTLY PRN
Status: DISCONTINUED | OUTPATIENT
Start: 2025-05-28 | End: 2025-05-30 | Stop reason: HOSPADM

## 2025-05-28 RX ORDER — LOSARTAN POTASSIUM 100 MG/1
100 TABLET ORAL DAILY
Status: DISCONTINUED | OUTPATIENT
Start: 2025-05-29 | End: 2025-05-30 | Stop reason: HOSPADM

## 2025-05-28 RX ORDER — PANTOPRAZOLE SODIUM 40 MG/1
40 TABLET, DELAYED RELEASE ORAL
Status: DISCONTINUED | OUTPATIENT
Start: 2025-05-29 | End: 2025-05-30 | Stop reason: HOSPADM

## 2025-05-28 RX ORDER — ACETAMINOPHEN 325 MG/1
975 TABLET ORAL ONCE
Status: COMPLETED | OUTPATIENT
Start: 2025-05-28 | End: 2025-05-28

## 2025-05-28 RX ORDER — PANTOPRAZOLE SODIUM 40 MG/10ML
40 INJECTION, POWDER, LYOPHILIZED, FOR SOLUTION INTRAVENOUS
Status: DISCONTINUED | OUTPATIENT
Start: 2025-05-29 | End: 2025-05-30 | Stop reason: HOSPADM

## 2025-05-28 RX ADMIN — METOPROLOL TARTRATE 5 MG: 5 INJECTION INTRAVENOUS at 22:33

## 2025-05-28 RX ADMIN — IOHEXOL 75 ML: 350 INJECTION, SOLUTION INTRAVENOUS at 16:51

## 2025-05-28 RX ADMIN — ACETAMINOPHEN 975 MG: 325 TABLET ORAL at 17:58

## 2025-05-28 RX ADMIN — DIPHENHYDRAMINE HYDROCHLORIDE 25 MG: 50 INJECTION, SOLUTION INTRAMUSCULAR; INTRAVENOUS at 22:33

## 2025-05-28 RX ADMIN — KETOROLAC TROMETHAMINE 15 MG: 15 INJECTION, SOLUTION INTRAMUSCULAR; INTRAVENOUS at 22:33

## 2025-05-28 RX ADMIN — SODIUM CHLORIDE 75 ML/HR: 0.9 INJECTION, SOLUTION INTRAVENOUS at 22:49

## 2025-05-28 SDOH — ECONOMIC STABILITY: HOUSING INSECURITY: IN THE PAST 12 MONTHS, HOW MANY TIMES HAVE YOU MOVED WHERE YOU WERE LIVING?: 0

## 2025-05-28 SDOH — SOCIAL STABILITY: SOCIAL INSECURITY: HAVE YOU HAD THOUGHTS OF HARMING ANYONE ELSE?: NO

## 2025-05-28 SDOH — ECONOMIC STABILITY: HOUSING INSECURITY: IN THE LAST 12 MONTHS, WAS THERE A TIME WHEN YOU WERE NOT ABLE TO PAY THE MORTGAGE OR RENT ON TIME?: NO

## 2025-05-28 SDOH — ECONOMIC STABILITY: FOOD INSECURITY: WITHIN THE PAST 12 MONTHS, THE FOOD YOU BOUGHT JUST DIDN'T LAST AND YOU DIDN'T HAVE MONEY TO GET MORE.: NEVER TRUE

## 2025-05-28 SDOH — ECONOMIC STABILITY: HOUSING INSECURITY: AT ANY TIME IN THE PAST 12 MONTHS, WERE YOU HOMELESS OR LIVING IN A SHELTER (INCLUDING NOW)?: NO

## 2025-05-28 SDOH — SOCIAL STABILITY: SOCIAL INSECURITY
WITHIN THE LAST YEAR, HAVE YOU BEEN RAPED OR FORCED TO HAVE ANY KIND OF SEXUAL ACTIVITY BY YOUR PARTNER OR EX-PARTNER?: NO

## 2025-05-28 SDOH — SOCIAL STABILITY: SOCIAL INSECURITY: WITHIN THE LAST YEAR, HAVE YOU BEEN HUMILIATED OR EMOTIONALLY ABUSED IN OTHER WAYS BY YOUR PARTNER OR EX-PARTNER?: NO

## 2025-05-28 SDOH — ECONOMIC STABILITY: FOOD INSECURITY: WITHIN THE PAST 12 MONTHS, YOU WORRIED THAT YOUR FOOD WOULD RUN OUT BEFORE YOU GOT THE MONEY TO BUY MORE.: NEVER TRUE

## 2025-05-28 SDOH — SOCIAL STABILITY: SOCIAL INSECURITY: ABUSE: ADULT

## 2025-05-28 SDOH — SOCIAL STABILITY: SOCIAL INSECURITY: HAVE YOU HAD ANY THOUGHTS OF HARMING ANYONE ELSE?: NO

## 2025-05-28 SDOH — SOCIAL STABILITY: SOCIAL INSECURITY: DO YOU FEEL UNSAFE GOING BACK TO THE PLACE WHERE YOU ARE LIVING?: NO

## 2025-05-28 SDOH — ECONOMIC STABILITY: FOOD INSECURITY: HOW HARD IS IT FOR YOU TO PAY FOR THE VERY BASICS LIKE FOOD, HOUSING, MEDICAL CARE, AND HEATING?: NOT HARD AT ALL

## 2025-05-28 SDOH — SOCIAL STABILITY: SOCIAL INSECURITY: HAS ANYONE EVER THREATENED TO HURT YOUR FAMILY OR YOUR PETS?: NO

## 2025-05-28 SDOH — SOCIAL STABILITY: SOCIAL INSECURITY: WITHIN THE LAST YEAR, HAVE YOU BEEN AFRAID OF YOUR PARTNER OR EX-PARTNER?: NO

## 2025-05-28 SDOH — SOCIAL STABILITY: SOCIAL INSECURITY: DO YOU FEEL ANYONE HAS EXPLOITED OR TAKEN ADVANTAGE OF YOU FINANCIALLY OR OF YOUR PERSONAL PROPERTY?: NO

## 2025-05-28 SDOH — SOCIAL STABILITY: SOCIAL INSECURITY
WITHIN THE LAST YEAR, HAVE YOU BEEN KICKED, HIT, SLAPPED, OR OTHERWISE PHYSICALLY HURT BY YOUR PARTNER OR EX-PARTNER?: NO

## 2025-05-28 SDOH — ECONOMIC STABILITY: INCOME INSECURITY: IN THE PAST 12 MONTHS HAS THE ELECTRIC, GAS, OIL, OR WATER COMPANY THREATENED TO SHUT OFF SERVICES IN YOUR HOME?: NO

## 2025-05-28 SDOH — SOCIAL STABILITY: SOCIAL INSECURITY: ARE YOU OR HAVE YOU BEEN THREATENED OR ABUSED PHYSICALLY, EMOTIONALLY, OR SEXUALLY BY ANYONE?: NO

## 2025-05-28 SDOH — SOCIAL STABILITY: SOCIAL INSECURITY: ARE THERE ANY APPARENT SIGNS OF INJURIES/BEHAVIORS THAT COULD BE RELATED TO ABUSE/NEGLECT?: NO

## 2025-05-28 SDOH — SOCIAL STABILITY: SOCIAL INSECURITY: WERE YOU ABLE TO COMPLETE ALL THE BEHAVIORAL HEALTH SCREENINGS?: YES

## 2025-05-28 SDOH — ECONOMIC STABILITY: TRANSPORTATION INSECURITY: IN THE PAST 12 MONTHS, HAS LACK OF TRANSPORTATION KEPT YOU FROM MEDICAL APPOINTMENTS OR FROM GETTING MEDICATIONS?: NO

## 2025-05-28 SDOH — SOCIAL STABILITY: SOCIAL INSECURITY: DOES ANYONE TRY TO KEEP YOU FROM HAVING/CONTACTING OTHER FRIENDS OR DOING THINGS OUTSIDE YOUR HOME?: NO

## 2025-05-28 ASSESSMENT — COGNITIVE AND FUNCTIONAL STATUS - GENERAL
MOBILITY SCORE: 24
PATIENT BASELINE BEDBOUND: NO
DAILY ACTIVITIY SCORE: 24

## 2025-05-28 ASSESSMENT — PAIN DESCRIPTION - DESCRIPTORS: DESCRIPTORS: SHARP;STABBING

## 2025-05-28 ASSESSMENT — ACTIVITIES OF DAILY LIVING (ADL)
TOILETING: INDEPENDENT
ADEQUATE_TO_COMPLETE_ADL: NO
LACK_OF_TRANSPORTATION: NO
HEARING - RIGHT EAR: FUNCTIONAL
FEEDING YOURSELF: INDEPENDENT
GROOMING: INDEPENDENT
WALKS IN HOME: INDEPENDENT
LACK_OF_TRANSPORTATION: NO
DRESSING YOURSELF: INDEPENDENT
JUDGMENT_ADEQUATE_SAFELY_COMPLETE_DAILY_ACTIVITIES: NO
HEARING - LEFT EAR: FUNCTIONAL
PATIENT'S MEMORY ADEQUATE TO SAFELY COMPLETE DAILY ACTIVITIES?: NO
BATHING: INDEPENDENT

## 2025-05-28 ASSESSMENT — PATIENT HEALTH QUESTIONNAIRE - PHQ9
1. LITTLE INTEREST OR PLEASURE IN DOING THINGS: NOT AT ALL
2. FEELING DOWN, DEPRESSED OR HOPELESS: NOT AT ALL
SUM OF ALL RESPONSES TO PHQ9 QUESTIONS 1 & 2: 0

## 2025-05-28 ASSESSMENT — PAIN DESCRIPTION - ONSET: ONSET: PROGRESSIVE

## 2025-05-28 ASSESSMENT — LIFESTYLE VARIABLES
EVER FELT BAD OR GUILTY ABOUT YOUR DRINKING: NO
HAVE YOU EVER FELT YOU SHOULD CUT DOWN ON YOUR DRINKING: NO
HOW OFTEN DO YOU HAVE 6 OR MORE DRINKS ON ONE OCCASION: NEVER
TOTAL SCORE: 0
SKIP TO QUESTIONS 9-10: 1
EVER HAD A DRINK FIRST THING IN THE MORNING TO STEADY YOUR NERVES TO GET RID OF A HANGOVER: NO
HOW OFTEN DO YOU HAVE A DRINK CONTAINING ALCOHOL: NEVER
HAVE PEOPLE ANNOYED YOU BY CRITICIZING YOUR DRINKING: NO
HOW MANY STANDARD DRINKS CONTAINING ALCOHOL DO YOU HAVE ON A TYPICAL DAY: PATIENT DOES NOT DRINK
AUDIT-C TOTAL SCORE: 0
AUDIT-C TOTAL SCORE: 0

## 2025-05-28 ASSESSMENT — PAIN - FUNCTIONAL ASSESSMENT
PAIN_FUNCTIONAL_ASSESSMENT: 0-10
PAIN_FUNCTIONAL_ASSESSMENT: 0-10

## 2025-05-28 ASSESSMENT — COLUMBIA-SUICIDE SEVERITY RATING SCALE - C-SSRS
6. HAVE YOU EVER DONE ANYTHING, STARTED TO DO ANYTHING, OR PREPARED TO DO ANYTHING TO END YOUR LIFE?: NO
2. HAVE YOU ACTUALLY HAD ANY THOUGHTS OF KILLING YOURSELF?: NO
1. IN THE PAST MONTH, HAVE YOU WISHED YOU WERE DEAD OR WISHED YOU COULD GO TO SLEEP AND NOT WAKE UP?: NO

## 2025-05-28 ASSESSMENT — PAIN DESCRIPTION - ORIENTATION: ORIENTATION: MID;LOWER;LEFT;RIGHT

## 2025-05-28 ASSESSMENT — PAIN SCALES - GENERAL
PAINLEVEL_OUTOF10: 7
PAINLEVEL_OUTOF10: 8

## 2025-05-28 ASSESSMENT — PAIN DESCRIPTION - PROGRESSION: CLINICAL_PROGRESSION: GRADUALLY WORSENING

## 2025-05-28 ASSESSMENT — PAIN DESCRIPTION - LOCATION
LOCATION: ABDOMEN
LOCATION: ABDOMEN

## 2025-05-28 ASSESSMENT — PAIN DESCRIPTION - PAIN TYPE: TYPE: ACUTE PAIN

## 2025-05-28 ASSESSMENT — PAIN DESCRIPTION - FREQUENCY: FREQUENCY: CONSTANT/CONTINUOUS

## 2025-05-28 NOTE — ED PROVIDER NOTES
Emergency Department Provider Note        History of Present Illness     History provided by: Patient  Limitations to History: None  External Records Reviewed with Brief Summary: None    HPI:  Jenaro Belcher is a 64-year-old male with a history of constipation-predominant irritable bowel syndrome (IBS) and gastroesophageal reflux disease (GERD) presents to the ED with abdominal pain and diarrhea. He rates the pain as 8/10 in severity, localized to the lower quadrants and suprapubic area. The patient reports worsening abdominal cramps that began approximately one week ago after starting Linzess for constipation. Notably, he underwent an appendectomy six weeks ago while on vacation in [facility]. He has not taken any pain medications but is willing to try acetaminophen. The patient’s primary concern is to determine the cause of his pain.    Physical Exam   Triage vitals:  T 36.7 °C (98.1 °F)  HR 67  /79  RR 18  O2 98 % None (Room air)    Physical Exam  Vitals and nursing note reviewed.   Constitutional:       General: He is not in acute distress.     Appearance: He is well-developed and normal weight. He is not ill-appearing, toxic-appearing or diaphoretic.   HENT:      Head: Normocephalic and atraumatic.   Eyes:      Extraocular Movements: Extraocular movements intact.      Pupils: Pupils are equal, round, and reactive to light.   Cardiovascular:      Rate and Rhythm: Normal rate and regular rhythm.      Heart sounds: Normal heart sounds. No murmur heard.     No friction rub. No gallop.   Pulmonary:      Effort: Pulmonary effort is normal. No respiratory distress.      Breath sounds: Normal breath sounds. No stridor. No wheezing, rhonchi or rales.   Abdominal:      General: Abdomen is protuberant. There is no distension or abdominal bruit.      Palpations: Abdomen is soft. There is no mass.      Tenderness: There is abdominal tenderness in the right lower quadrant, suprapubic area and left lower quadrant.  There is no guarding or rebound.      Hernia: No hernia is present.   Skin:     General: Skin is warm.      Capillary Refill: Capillary refill takes less than 2 seconds.      Coloration: Skin is not cyanotic, jaundiced, mottled or pale.      Findings: No erythema.   Neurological:      General: No focal deficit present.      Mental Status: He is alert and oriented to person, place, and time.      Motor: No weakness.   Psychiatric:         Mood and Affect: Mood normal. Mood is not anxious or depressed.         Behavior: Behavior normal.        Medical Decision Making & ED Course   Medical Decision Makin y.o. male with a history of constipation-predominant IBS and recent appendectomy presenting with lower abdominal pain and diarrhea. The differential diagnosis includes IBS flare, post-surgical complications such as abscess or bowel obstruction, infectious gastroenteritis, medication side effects from Linzess, and less likely inflammatory bowel disease.     The ED workup included monitoring of vital signs, a focused abdominal examination, and laboratory studies including CBC, CMP, lactate, lipase, and urinalysis. An abdominal CT scan was obtained to evaluate for possible post-appendectomy complications.    A stat EKG demonstrated sinus bradycardia with a ventricular rate of 58 bpm, normal axis, WI interval of 210 ms, QRS duration of 90 ms, and QTc of 406 ms, without evidence of cardiac ischemia.    The patient received symptomatic treatment with acetaminophen for pain control.    The case was discussed with the ED attending, Dr. Landeros, who evaluated the patient at the bedside. Given concern for bowel obstruction on imaging, general surgery was consulted. Dr. Guardado recommended admission to the medicine service with general surgery follow-up and advised placement of a nasogastric (NG) tube.    On reassessment, the patient remained hemodynamically stable. He was informed of the lab and imaging findings confirming  bowel obstruction and the proposed management plan. He was agreeable and was admitted under the care of Dr. Espinoza on the medicine service for further evaluation and management in coordination with general surgery.    ----      Differential diagnoses considered include but are not limited to:  IBS flare, bowel obstruction, infectious gastroenteritis     Social Determinants of Health which Significantly Impact Care: None identified     EKG Independent Interpretation: EKG interpreted by myself. Please see ED Course for full interpretation.    Independent Result Review and Interpretation: Relevant laboratory and radiographic results were reviewed and independently interpreted by myself.  As necessary, they are commented on in the ED Course.    Chronic conditions affecting the patient's care: As documented above in [facility]    The patient was discussed with the following consultants/services: General Surgery, Dr. Guardado, who recommended admitting the patient to medicine with general surgery follow-up.  He did recommend NG tube placement.    Care Considerations: As documented above in [facility]    ED Course:  ED Course as of 05/28/25 2115   Wed May 28, 2025   1744 EKG taken at 1728 on 5/28/2025 showing normal sinus bradycardia with a rate of 58, normal axis, first-degree AV block NM interval 210, otherwise normal intervals, no acute ST elevation or depression [DS]   1807 At 1553, CBC results released shows no leukocytosis, no evidence of significant anemia.  Lactate within normal limits.  At 1618, chemistry panel is unremarkable.  Lipase is normal.  Magnesium is normal. At 1808, urinalysis release [CO]   1920 At 1755, CT abdominal pelvis results was released.  Notable for Multiple fluid-filled dilated small bowel loops, measuring up to 3.5 cm with transition point in the mid abdomen, compatible with acute small bowel obstruction. No pneumoperitoneum or ascites.   [CO]      ED Course User Index  [CO] Jose Grimm MD  [DS]  Zane Landeros MD         Diagnoses as of 05/28/25 2115   SBO (small bowel obstruction) (Multi)     Disposition   As a result of their workup, the patient will require admission to the hospital.  The patient was informed of his diagnosis.  The patient was given the opportunity to ask questions and I answered them. The patient agreed to be admitted to the hospital.    Procedures   Procedures    This was a shared visit with an ED attending.  The patient was seen and discussed with the ED attending Dr. Landeros.    Jose Grimm MD  Emergency Medicine, PGY-2     Jose Grimm MD  Resident  05/28/25 2115

## 2025-05-28 NOTE — TELEPHONE ENCOUNTER
Patient called in. He's having extreme abdominal cramping with the Linzess 145mg. He states he did not take it today. He wants to know how he should proceed.

## 2025-05-28 NOTE — PROGRESS NOTES
Patient called due to feeling of worsening abdominal pain with taking Linzess. Patient states he was able to take Linzess 145mcg over the past 6 days with good response and good daily bowel movements once in the morning. However, he had to skip today's dose due to experiencing a significant amount of abdominal pain making it hard to get in and out of his car. No fever, chills, melena, hematochezia, NV. Recommended patient make a visit to the ED to further assess. He will go to [facility].    Nanda BRYAN-CNP

## 2025-05-28 NOTE — ED TRIAGE NOTES
Emergency Department Encounter  [facility] [place] EMERGENCY MEDICINE    Patient: Jenaro Belcher  MRN: [id number]  : 1960  Date of Evaluation: 2025  Triage Provider: Genna Alonso PA-C      Chief Complaint       Chief Complaint   Patient presents with    Abdominal Pain     Patient had an appendectomy 6 weeks ago. Patient states he has continuous pain for the last 6 weeks. He also states that it hurts when he urinates. Patient also states he is having diarrhea.    Diarrhea     [place]    (Location/Symptom, Timing/Onset, Context/Setting, Quality, Duration, Modifying Factors, Severity) Note limiting factors.     Patient is a 64-year-old male presenting today for abdominal pain.  An emergency appendectomy in [place] on .  States he splits time between [place] and here.  Continue to have issues with constipation and abdominal pain after the surgery and presented to the ED on  at an outside facility and noted that he may possibly have a partial small bowel obstruction though CT with oral contrast looked okay so he was sent home on MiraLAX twice daily.  States that the MiraLAX twice daily was worsening his abdominal pain.  Describes as generalized pain that is crampy in nature.  Followed up with outpatient GI on  recommended repeat imaging and starting the patient on Linzess.  Patient is no longer on narcotics.  States that he has been taking the Linzess for the last 6 days and it causes severe abdominal cramping, similar to prior but notes that it is worse than normal.  States that he will take the pill and have a small amount of diarrhea but feels severely constipated still.  Passing a large amount of gas.  No emesis but endorsing mild nausea.        Past History   Medical History[1]  Surgical History[2]  Social History[3]    Medications/Allergies     Previous Medications    DESIPRAMINE (NORPRAMIN) 10 MG TABLET    Take 1 tablet (10 mg) by mouth once daily at bedtime.    ESOMEPRAZOLE MAGNESIUM  (NEXIUM 24HR) 20 MG TABLET,DELAYED RELEASE (DR/EC)    Take 20 mg by mouth once daily.    KETOCONAZOLE (NIZORAL) 2 % CREAM    Apply once daily abdominal left foot.    LOSARTAN (COZAAR) 100 MG TABLET    Take 1 tablet (100 mg) by mouth once daily.    METOPROLOL SUCCINATE XL (TOPROL-XL) 50 MG 24 HR TABLET    Take 1 tablet (50 mg) by mouth once daily. Do not crush or chew.    OMEPRAZOLE (PRILOSEC) 20 MG DR CAPSULE    Take 1 capsule (20 mg) by mouth 2 times a day.    VARDENAFIL (LEVITRA) 10 MG TABLET    Take 1 tablet (10 mg) by mouth once daily as needed for erectile dysfunction.     Allergies[4]     Physical Exam       ED Triage Vitals [05/28/25 1502]   Temperature Heart Rate Respirations BP   36.7 °C (98.1 °F) 67 18 125/79      Pulse Ox Temp Source Heart Rate Source Patient Position   98 % Temporal Monitor Sitting      BP Location FiO2 (%)     Right arm --         Physical Exam    Focused PE    GENERAL:  The patient appears nourished and normally developed. Vital signs as documented.     PULMONARY:  Lungs are clear to auscultation, without any respiratory distress. Able to speak full sentences, no accessory muscle use    CARDIAC:   Normal rate. No murmurs, rubs or gallops    ABDOMEN: Generalized pain to palpation of the entire abdomen.  Guarding noted.  No distention.    MUSCULOSKELETAL:   Able to ambulate, Non edematous, with no obvious deformities. Pulses intact distal    SKIN:   Good color, with no significant rashes.  No pallor.    NEURO:  Alert and oriented, speech clear and coherent    Plan     Abdominal pain  - labs  - CT  - bentyl      For the remainder of the patient's workup and ED course, please see the main ED provider note.  We discussed need for diagnostic testing including lab studies and imaging.  We also discussed that they may be asked to wait in the waiting room while these test are pending.  They understand that if they choose to leave without having the testing completed or resulted that we cannot  rule out acute life-threatening illnesses and the risks involved to lead to worsening condition, permanent disability or even death.        Comment: Please note this report has been produced using speech recognition software and may contain errors related to that system including errors in grammar, punctuation, and spelling, as well as words and phrases that may be inappropriate.  If there are any questions or concerns please feel free to contact the dictating provider for clarification.    Genna Alonso PA-C        [1]   Past Medical History:  Diagnosis Date    Personal history of other diseases of the circulatory system     History of hypertension   [2]   Past Surgical History:  Procedure Laterality Date    COLONOSCOPY  02/19/2014    Complete Colonoscopy    ESOPHAGOGASTRODUODENOSCOPY  02/19/2014    Diagnostic Esophagogastroduodenoscopy    KNEE ARTHROPLASTY      OTHER SURGICAL HISTORY  01/06/2023    Shoulder surgery    OTHER SURGICAL HISTORY  11/12/2019    Knee surgery   [3]   Social History  Socioeconomic History    Marital status:    Tobacco Use    Smoking status: Former     Types: Cigarettes    Smokeless tobacco: Former   Vaping Use    Vaping status: Never Used   Substance and Sexual Activity    Alcohol use: Yes     Comment: weekends    Drug use: Not Currently     Types: Marijuana     Comment: occasional gummy     Social Drivers of Health     Food Insecurity: No Food Insecurity (9/5/2024)    Hunger Vital Sign     Worried About Running Out of Food in the Last Year: Never true     Ran Out of Food in the Last Year: Never true   Transportation Needs: No Transportation Needs (8/29/2024)    OASIS : Transportation     Lack of Transportation (Medical): No     Lack of Transportation (Non-Medical): No     Patient Unable or Declines to Respond: No   Social Connections: Feeling Socially Integrated (8/29/2024)    OASIS : Social Isolation     Frequency of experiencing loneliness or isolation: Rarely   [4]    Allergies  Allergen Reactions    Oxycodone Other     Severe nausea    Tapentadol Rash    Nitrofurantoin Macrocrystalline Hives

## 2025-05-29 ENCOUNTER — APPOINTMENT (OUTPATIENT)
Dept: RADIOLOGY | Facility: HOSPITAL | Age: 65
End: 2025-05-29
Payer: COMMERCIAL

## 2025-05-29 LAB
ALBUMIN SERPL BCP-MCNC: 3.6 G/DL (ref 3.4–5)
ALP SERPL-CCNC: 46 U/L (ref 33–136)
ALT SERPL W P-5'-P-CCNC: 8 U/L (ref 10–52)
ANION GAP SERPL CALC-SCNC: 10 MMOL/L (ref 10–20)
AST SERPL W P-5'-P-CCNC: 9 U/L (ref 9–39)
ATRIAL RATE: 58 BPM
BILIRUB DIRECT SERPL-MCNC: 0.3 MG/DL (ref 0–0.3)
BILIRUB SERPL-MCNC: 1.5 MG/DL (ref 0–1.2)
BUN SERPL-MCNC: 13 MG/DL (ref 6–23)
CALCIUM SERPL-MCNC: 8.9 MG/DL (ref 8.6–10.3)
CHLORIDE SERPL-SCNC: 105 MMOL/L (ref 98–107)
CO2 SERPL-SCNC: 27 MMOL/L (ref 21–32)
CREAT SERPL-MCNC: 0.74 MG/DL (ref 0.5–1.3)
EGFRCR SERPLBLD CKD-EPI 2021: >90 ML/MIN/1.73M*2
ERYTHROCYTE [DISTWIDTH] IN BLOOD BY AUTOMATED COUNT: 12.8 % (ref 11.5–14.5)
GLUCOSE SERPL-MCNC: 83 MG/DL (ref 74–99)
HCT VFR BLD AUTO: 36.3 % (ref 41–52)
HGB BLD-MCNC: 11.7 G/DL (ref 13.5–17.5)
HOLD SPECIMEN: NORMAL
HOLD SPECIMEN: NORMAL
MCH RBC QN AUTO: 30.5 PG (ref 26–34)
MCHC RBC AUTO-ENTMCNC: 32.2 G/DL (ref 32–36)
MCV RBC AUTO: 95 FL (ref 80–100)
NRBC BLD-RTO: 0 /100 WBCS (ref 0–0)
P AXIS: 65 DEGREES
P OFFSET: 185 MS
P ONSET: 119 MS
PLATELET # BLD AUTO: 235 X10*3/UL (ref 150–450)
POTASSIUM SERPL-SCNC: 4 MMOL/L (ref 3.5–5.3)
PR INTERVAL: 210 MS
PROT SERPL-MCNC: 6 G/DL (ref 6.4–8.2)
Q ONSET: 224 MS
QRS COUNT: 9 BEATS
QRS DURATION: 90 MS
QT INTERVAL: 414 MS
QTC CALCULATION(BAZETT): 406 MS
QTC FREDERICIA: 409 MS
R AXIS: 42 DEGREES
RBC # BLD AUTO: 3.83 X10*6/UL (ref 4.5–5.9)
SODIUM SERPL-SCNC: 138 MMOL/L (ref 136–145)
T AXIS: 38 DEGREES
T OFFSET: 431 MS
VENTRICULAR RATE: 58 BPM
WBC # BLD AUTO: 5.2 X10*3/UL (ref 4.4–11.3)

## 2025-05-29 PROCEDURE — 85027 COMPLETE CBC AUTOMATED: CPT | Performed by: PHYSICIAN ASSISTANT

## 2025-05-29 PROCEDURE — 2500000004 HC RX 250 GENERAL PHARMACY W/ HCPCS (ALT 636 FOR OP/ED): Performed by: PHYSICIAN ASSISTANT

## 2025-05-29 PROCEDURE — 82565 ASSAY OF CREATININE: CPT | Performed by: PHYSICIAN ASSISTANT

## 2025-05-29 PROCEDURE — 2500000004 HC RX 250 GENERAL PHARMACY W/ HCPCS (ALT 636 FOR OP/ED): Mod: JZ | Performed by: NURSE PRACTITIONER

## 2025-05-29 PROCEDURE — 84075 ASSAY ALKALINE PHOSPHATASE: CPT | Performed by: PHYSICIAN ASSISTANT

## 2025-05-29 PROCEDURE — 2500000001 HC RX 250 WO HCPCS SELF ADMINISTERED DRUGS (ALT 637 FOR MEDICARE OP): Performed by: PHYSICIAN ASSISTANT

## 2025-05-29 PROCEDURE — 1200000002 HC GENERAL ROOM WITH TELEMETRY DAILY

## 2025-05-29 PROCEDURE — 99221 1ST HOSP IP/OBS SF/LOW 40: CPT | Performed by: PHYSICIAN ASSISTANT

## 2025-05-29 PROCEDURE — 2500000001 HC RX 250 WO HCPCS SELF ADMINISTERED DRUGS (ALT 637 FOR MEDICARE OP): Performed by: NURSE PRACTITIONER

## 2025-05-29 PROCEDURE — 74018 RADEX ABDOMEN 1 VIEW: CPT | Performed by: STUDENT IN AN ORGANIZED HEALTH CARE EDUCATION/TRAINING PROGRAM

## 2025-05-29 PROCEDURE — 74018 RADEX ABDOMEN 1 VIEW: CPT

## 2025-05-29 PROCEDURE — 36415 COLL VENOUS BLD VENIPUNCTURE: CPT | Performed by: PHYSICIAN ASSISTANT

## 2025-05-29 RX ORDER — KETOROLAC TROMETHAMINE 15 MG/ML
15 INJECTION, SOLUTION INTRAMUSCULAR; INTRAVENOUS ONCE
Status: COMPLETED | OUTPATIENT
Start: 2025-05-29 | End: 2025-05-29

## 2025-05-29 RX ORDER — BISACODYL 10 MG/1
10 SUPPOSITORY RECTAL ONCE
Status: COMPLETED | OUTPATIENT
Start: 2025-05-29 | End: 2025-05-29

## 2025-05-29 RX ADMIN — METOPROLOL TARTRATE 5 MG: 5 INJECTION INTRAVENOUS at 16:30

## 2025-05-29 RX ADMIN — BENZOCAINE AND MENTHOL 1 LOZENGE: 15; 3.6 LOZENGE ORAL at 12:36

## 2025-05-29 RX ADMIN — PHENOL 1 SPRAY: 1.5 LIQUID ORAL at 09:21

## 2025-05-29 RX ADMIN — SODIUM CHLORIDE 75 ML/HR: 0.9 INJECTION, SOLUTION INTRAVENOUS at 14:24

## 2025-05-29 RX ADMIN — PHENOL 1 SPRAY: 1.5 LIQUID ORAL at 06:40

## 2025-05-29 RX ADMIN — KETOROLAC TROMETHAMINE 15 MG: 15 INJECTION, SOLUTION INTRAMUSCULAR; INTRAVENOUS at 12:46

## 2025-05-29 RX ADMIN — BISACODYL 10 MG: 10 SUPPOSITORY RECTAL at 14:24

## 2025-05-29 RX ADMIN — DIPHENHYDRAMINE HYDROCHLORIDE 25 MG: 50 INJECTION, SOLUTION INTRAMUSCULAR; INTRAVENOUS at 21:54

## 2025-05-29 RX ADMIN — METOPROLOL TARTRATE 5 MG: 5 INJECTION INTRAVENOUS at 21:51

## 2025-05-29 RX ADMIN — PANTOPRAZOLE SODIUM 40 MG: 40 INJECTION, POWDER, FOR SOLUTION INTRAVENOUS at 06:30

## 2025-05-29 ASSESSMENT — PAIN DESCRIPTION - LOCATION: LOCATION: THROAT

## 2025-05-29 ASSESSMENT — COGNITIVE AND FUNCTIONAL STATUS - GENERAL
DAILY ACTIVITIY SCORE: 24
DAILY ACTIVITIY SCORE: 24
MOBILITY SCORE: 24
MOBILITY SCORE: 24

## 2025-05-29 ASSESSMENT — PAIN SCALES - GENERAL
PAINLEVEL_OUTOF10: 3
PAINLEVEL_OUTOF10: 7
PAINLEVEL_OUTOF10: 3
PAINLEVEL_OUTOF10: 0 - NO PAIN
PAINLEVEL_OUTOF10: 0 - NO PAIN

## 2025-05-29 ASSESSMENT — PAIN DESCRIPTION - DESCRIPTORS: DESCRIPTORS: SORE

## 2025-05-29 ASSESSMENT — PAIN - FUNCTIONAL ASSESSMENT: PAIN_FUNCTIONAL_ASSESSMENT: 0-10

## 2025-05-29 ASSESSMENT — PAIN SCALES - WONG BAKER: WONGBAKER_NUMERICALRESPONSE: HURTS LITTLE BIT
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
## 2025-05-29 NOTE — CONSULTS
Reason For Consult  SBO    This note was created using voice recognition transcription software. Despite proofreading, unintentional typographical errors may be present. Please contact the GI office with any questions or concerns.       This is a 63 yo Male with a PMH of Pickett's esophagus, HTN, CTS, former smoker, GERD, HH, HTN, liver cyst, pancreatic cyst, PMR, and recent SBO who presented to [facility] on 25 with reports of abdominal pain/nausea/vomiting.  GI was consulted for SBO.  He recently had an appendectomy in [facility] and has been having constipation since and intermittent bowel movements and was sent to the ED.  He is established with Nanda Johnson CNP and LOV 25.  His wife is at the bedside.          Subjective  He states he had an appendectomy in [facility] about 6 weeks ago and has had lower abdominal pain even after urinating since the procedure.  No prior issues with constipation but has had intermittent constipation since the procedure.  He states he was told to take Miralax which made his symptoms worse.  Then he states he was given Linzess which also made his symptoms worse.       EUS 24 showing Pickett's, gastritis,, pancreatic cyst, liver cyst  EGD-22 showing Stage C1-M2 mucosa, 2 CM HH  Colonoscopy-22 showing 1-15 mm polyp in rectum, IH/EH.  Repeat in 3 years.   BM-Was daily prior to the surgery.  Normal consistency.  No bleeding or weight loss.  FHX-Dad  of pancreatic CA at age 62.  SHX-No tobacco/illicits/ETOH.  Former tobacco use.  Ab Sx-Appendectomy in [facility] ~ 2025  NSAIDs-Denies         Allergies: as mentioned in H&P      A 10 point review of system is negative except for what is mentioned in the HPI    Vital Signs: Reviewed    Physical Exam:  General: Mildly anxious  Skin:  Warm and dry, no jaundice  HEENT: No scleral icterus, no conjunctival pallor, normocephalic, atraumatic, mucous membranes moist  Neck:  atraumatic, trachea midline, no  "JVD  Chest:  Clear to auscultation bilaterally. No wheezes, rales, or rhonchi  CV:  Regular rate and rhythm.  Positive S1/S2  Abdomen: no distension, +BS, soft, mildly tender to palpation in lower abdomen, no discernible ascites   Extremities: no lower extremity edema, chronic pigmentary changes, no cyanosis  Neurological:  A&Ox3  Psychiatric: cooperative     Investigations:  Labs, radiological imaging and cardiac work up were reviewed      Objective:         5/28/2025     4:15 PM 5/28/2025     6:00 PM 5/28/2025     9:10 PM 5/29/2025    12:00 AM 5/29/2025     4:00 AM 5/29/2025     5:52 AM 5/29/2025     8:00 AM   Vitals   Systolic 138 144 140 138 135  123   Diastolic 81 80 94 95 88  94   BP Location Right arm Right arm Left arm Right arm Right arm     Heart Rate 61 62 66 55 52  68   Temp   36.5 °C (97.7 °F) 36.5 °C (97.7 °F) 37 °C (98.6 °F)  36.5 °C (97.7 °F)   Resp 16 16 18 18 18  16   Height   1.803 m (5' 11\")       Weight (lb)   182.1   182.1    BMI   25.4 kg/m2   25.4 kg/m2    BSA (m2)   2.03 m2   2.03 m2           Medications:  Scheduled Medications[1]     Recent Results (from the past 72 hours)   CBC and Auto Differential    Collection Time: 05/28/25  3:44 PM   Result Value Ref Range    WBC 7.6 4.4 - 11.3 x10*3/uL    nRBC 0.0 0.0 - 0.0 /100 WBCs    RBC 4.20 (L) 4.50 - 5.90 x10*6/uL    Hemoglobin 13.0 (L) 13.5 - 17.5 g/dL    Hematocrit 39.1 (L) 41.0 - 52.0 %    MCV 93 80 - 100 fL    MCH 31.0 26.0 - 34.0 pg    MCHC 33.2 32.0 - 36.0 g/dL    RDW 12.9 11.5 - 14.5 %    Platelets 321 150 - 450 x10*3/uL    Neutrophils % 72.1 40.0 - 80.0 %    Immature Granulocytes %, Automated 0.1 0.0 - 0.9 %    Lymphocytes % 20.0 13.0 - 44.0 %    Monocytes % 6.0 2.0 - 10.0 %    Eosinophils % 1.4 0.0 - 6.0 %    Basophils % 0.4 0.0 - 2.0 %    Neutrophils Absolute 5.50 1.20 - 7.70 x10*3/uL    Immature Granulocytes Absolute, Automated 0.01 0.00 - 0.70 x10*3/uL    Lymphocytes Absolute 1.53 1.20 - 4.80 x10*3/uL    Monocytes Absolute 0.46 " 0.10 - 1.00 x10*3/uL    Eosinophils Absolute 0.11 0.00 - 0.70 x10*3/uL    Basophils Absolute 0.03 0.00 - 0.10 x10*3/uL   Comprehensive metabolic panel    Collection Time: 05/28/25  3:44 PM   Result Value Ref Range    Glucose 92 74 - 99 mg/dL    Sodium 138 136 - 145 mmol/L    Potassium 4.5 3.5 - 5.3 mmol/L    Chloride 101 98 - 107 mmol/L    Bicarbonate 29 21 - 32 mmol/L    Anion Gap 13 10 - 20 mmol/L    Urea Nitrogen 14 6 - 23 mg/dL    Creatinine 0.69 0.50 - 1.30 mg/dL    eGFR >90 >60 mL/min/1.73m*2    Calcium 9.6 8.6 - 10.3 mg/dL    Albumin 4.4 3.4 - 5.0 g/dL    Alkaline Phosphatase 58 33 - 136 U/L    Total Protein 6.8 6.4 - 8.2 g/dL    AST 11 9 - 39 U/L    Bilirubin, Total 1.3 (H) 0.0 - 1.2 mg/dL    ALT 11 10 - 52 U/L   Lipase    Collection Time: 05/28/25  3:44 PM   Result Value Ref Range    Lipase 23 9 - 82 U/L   Lactate    Collection Time: 05/28/25  3:44 PM   Result Value Ref Range    Lactate 0.7 0.4 - 2.0 mmol/L   Magnesium    Collection Time: 05/28/25  3:44 PM   Result Value Ref Range    Magnesium 2.16 1.60 - 2.40 mg/dL   ECG 12 lead    Collection Time: 05/28/25  5:29 PM   Result Value Ref Range    Ventricular Rate 58 BPM    Atrial Rate 58 BPM    OK Interval 210 ms    QRS Duration 90 ms    QT Interval 414 ms    QTC Calculation(Bazett) 406 ms    P Axis 65 degrees    R Axis 42 degrees    T Axis 38 degrees    QRS Count 9 beats    Q Onset 224 ms    P Onset 119 ms    P Offset 185 ms    T Offset 431 ms    QTC Fredericia 409 ms   Urinalysis with Reflex Culture and Microscopic    Collection Time: 05/28/25  5:54 PM   Result Value Ref Range    Color, Urine Light-Brown (N) Light-Yellow, Yellow, Dark-Yellow    Appearance, Urine Clear Clear    Specific Gravity, Urine 1.048 (N) 1.005 - 1.035    pH, Urine 7.5 5.0, 5.5, 6.0, 6.5, 7.0, 7.5, 8.0    Protein, Urine NEGATIVE NEGATIVE, 10 (TRACE), 20 (TRACE) mg/dL    Glucose, Urine Normal Normal mg/dL    Blood, Urine NEGATIVE NEGATIVE mg/dL    Ketones, Urine NEGATIVE NEGATIVE mg/dL     Bilirubin, Urine NEGATIVE NEGATIVE mg/dL    Urobilinogen, Urine Normal Normal mg/dL    Nitrite, Urine NEGATIVE NEGATIVE    Leukocyte Esterase, Urine NEGATIVE NEGATIVE   Extra Urine Gray Tube    Collection Time: 05/28/25  5:54 PM   Result Value Ref Range    Extra Tube Hold for add-ons.    CBC    Collection Time: 05/29/25  6:04 AM   Result Value Ref Range    WBC 5.2 4.4 - 11.3 x10*3/uL    nRBC 0.0 0.0 - 0.0 /100 WBCs    RBC 3.83 (L) 4.50 - 5.90 x10*6/uL    Hemoglobin 11.7 (L) 13.5 - 17.5 g/dL    Hematocrit 36.3 (L) 41.0 - 52.0 %    MCV 95 80 - 100 fL    MCH 30.5 26.0 - 34.0 pg    MCHC 32.2 32.0 - 36.0 g/dL    RDW 12.8 11.5 - 14.5 %    Platelets 235 150 - 450 x10*3/uL   Basic Metabolic Panel    Collection Time: 05/29/25  6:04 AM   Result Value Ref Range    Glucose 83 74 - 99 mg/dL    Sodium 138 136 - 145 mmol/L    Potassium 4.0 3.5 - 5.3 mmol/L    Chloride 105 98 - 107 mmol/L    Bicarbonate 27 21 - 32 mmol/L    Anion Gap 10 10 - 20 mmol/L    Urea Nitrogen 13 6 - 23 mg/dL    Creatinine 0.74 0.50 - 1.30 mg/dL    eGFR >90 >60 mL/min/1.73m*2    Calcium 8.9 8.6 - 10.3 mg/dL   Hepatic Function Panel    Collection Time: 05/29/25  6:04 AM   Result Value Ref Range    Albumin 3.6 3.4 - 5.0 g/dL    Bilirubin, Total 1.5 (H) 0.0 - 1.2 mg/dL    Bilirubin, Direct 0.3 0.0 - 0.3 mg/dL    Alkaline Phosphatase 46 33 - 136 U/L    ALT 8 (L) 10 - 52 U/L    AST 9 9 - 39 U/L    Total Protein 6.0 (L) 6.4 - 8.2 g/dL          Assessment:  This is a 63 yo Male with a PMH of Pickett's esophagus, HTN, CTS, former smoker, GERD, HH, HTN, liver cyst, pancreatic cyst, PMR, and recent SBO who presented to [facility] on 5/28/25 with reports of abdominal pain/nausea/vomiting.  GI was consulted for SBO.  He recently had an appendectomy in [facility] and has been having constipation since and intermittent bowel movements and was sent to the ED.  He is established with Nanda Johnson CNP and saw her 5/21/25. Lower abdominal pain with intermittent  constipation, weight loss, and unresolved SBO since his appendectomy in April.  Currently has NGT that has provided minimal relief.  Passing gas, and abdomen is soft and lower abdomen is slightly tender.  This is more of a General Surgery issue.      CT A/P 5/28/25 showed multiple fluid filled dilated small bowel loops compatible with SBO.  KUB today shows interval improvement in small bowels with large colonic stool notated.    Chronically elevated T bili.  Currently 1.5.      Plan  1.)  SBO-Serial abdominal exams, encourage mobility.  Avoid narcotics.  Appreciate General Surgery's recommendations.  Patient can follow up as an outpatient after discharge.  He is due for a colonoscopy this year and Pickett's screening.  No GI interventions at this time.  Will defer for surgical evaluation/management.  2.)  Pancreatic/liver cysts-Patient states he follows with Dr. Joshi for this.        Discussed above patient assessment and plan with Dr. Hernandez.  Will sign off.  I spent 75 minutes in the professional and overall care of this patient.      05/29/25 at 9:13 AM - IRVIN Barnard-CNP          [1] [Held by provider] losartan, 100 mg, oral, Daily  [Held by provider] metoprolol succinate XL, 50 mg, oral, Daily  metoprolol, 5 mg, intravenous, q6h  pantoprazole, 40 mg, oral, Daily before breakfast   Or  pantoprazole, 40 mg, intravenous, Daily before breakfast

## 2025-05-29 NOTE — CARE PLAN
EOS Note Pt has remained HDS throughout shift. He remains AO x 4, nsr/tele and room air. Pt's NG tube was removed this afternoon and pt will begin a clear liquid diet and advance slowly, as tolerated. Pt was given a suppository and did have a decent size BM today. Pt is independent in room and he does have NS running at 75mL/hr. Pt is resting comfortably in bed, alarms on and call light within reach.      The patient's goals for the shift include      The clinical goals for the shift include see plan of care         Cellulitis

## 2025-05-29 NOTE — CONSULTS
Reason For Consult  Abdominal pain    History Of Present Illness  Jenaro Belcher is a 64 y.o. male presenting approximately 6 weeks post laparoscopic appendectomy done in [facility].  Patient was on a river boat cruise vacation when he developed sudden sharp crampy abdominal pain which persisted for over 24 hours before eating the boat made port at [facility] at which time he went to the hospital he was diagnosed with a perforated appendicitis.  Since his return home this will be his third visit to ERs for abdominal pain nausea and vomiting that required NG tube placement for decompression he has not had a second operation.  He has a past history that includes GERD Pickett's esophagus he has had several endoscopies. He has had an NGT to [facility] since last evening, he is feeling much better, his distension is shree, he is passing flatus,     Past Medical History  He has a past medical history of Acute appendicitis with rupture, Pickett esophagus, Carpal tunnel syndrome, Cervical spondylosis, Former smoker, GERD (gastroesophageal reflux disease), Hiatal hernia, HTN (hypertension), Liver cyst, Lumbosacral radiculopathy, Pancreatic cyst ([facility]), Personal history of other diseases of the circulatory system, PMR (polymyalgia rheumatica) (Multi), Small bowel obstruction (Multi), and Tinea pedis of left foot.    Surgical History  He has a past surgical history that includes Colonoscopy (02/19/2014); Esophagogastroduodenoscopy (02/19/2014); Other surgical history (Right, 01/06/2023); Other surgical history (11/12/2019); Knee Arthroplasty (Right); and Appendectomy.     Social History  He reports that he has quit smoking. His smoking use included cigarettes. He has quit using smokeless tobacco. He reports current alcohol use. He reports that he does not currently use drugs after having used the following drugs: Marijuana.    Family History  Family History[1]     Allergies  Oxycodone, Tapentadol, and Nitrofurantoin  "macrocrystalline    Review of Systems  As per HPI otherwise negative     Physical Exam  Acute, nontoxic, 64-year-old male in no acute distress he is sitting on the side of the bed with his feet on the floor talking on the phone he does have an NG tube present  He has clear breath sounds bilaterally he has regular rate and rhythm  Abdomen is flat, soft, non tender     Last Recorded Vitals  Blood pressure (!) 144/100, pulse 55, temperature 36.5 °C (97.7 °F), temperature source Temporal, resp. rate 16, height 1.803 m (5' 11\"), weight 82.6 kg (182 lb 1.6 oz), SpO2 97%.    Relevant Results  Partial SBO, resolving     Assessment/Plan     Will round with Dr. Guardado and discuss plans. Anticipate DC NGT and resume diet?    I spent 30 minutes in the professional and overall care of this patient.      Eleno Romero PA-C         [1]   Family History  Problem Relation Name Age of Onset    Pancreatic cancer Father  60     "

## 2025-05-29 NOTE — ASSESSMENT & PLAN NOTE
Abdominal pain  Acute small bowel obstruction   Livers cysts (larges in right lobe 6.1 cm), not new per pt & wife  2.2 cm hypervascular lesion upper pole of spleen/likely hemangioma stable since 2024

## 2025-05-29 NOTE — CARE PLAN
The patient's goals for the shift include      The clinical goals for the shift include see POC      Problem: Pain - Adult  Goal: Verbalizes/displays adequate comfort level or baseline comfort level  Outcome: Progressing     Problem: Safety - Adult  Goal: Free from fall injury  Outcome: Progressing

## 2025-05-29 NOTE — PROGRESS NOTES
"Advanced Practice Admission Note    History Of Present Illness  Jenaro Belcher is a 64 y.o. male presented to [facility] on 5/28/2025 from home with a chief complaint of abdominal pain.    His past medical history is remarkable for liver cysts, Pickett's esophagus, GERD, hypertension, chronic constipation with overflow, recurrent small bowel obstruction since undergoing a laparoscopic appendectomy in [facility] in April 2025 requiring NG tube placement for decompression, hiatal hernia, lumbosacral radiculopathy, cervical spondylosis, carpal tunnel syndrome, and PMR.     During my evaluation, the patient is alert and oriented x 3.  The patient tells me he has been having diffuse abdominal pain for approximately 6-1/2 weeks ago since he underwent a laparoscopic appendectomy in [facility].  He was found to have acute appendicitis with rupture and was hospitalized in [facility] from 4/11/2025 to 4/16/2025.  The patient and his wife both assists with the history.  His wife tells me they were in [facility] for their  42nd wedding anniversary.  The patient tells me that since his surgery, he has had, \"good and bad days.\"  He tells me he started a new medication a few days ago called Linzess prescribed by his gastroenterology nurse practitioner.  He tells me on Monday evening, the pain in his abdomen got, \"really bad.\".  He says he called his gastroenterology office and was advised to stop the Linzess and go to the ER.  He tells me his pain level has been ranging between 5-9 out of 10.  He also reports a poor appetite and a 15 pound weight loss in 4 to 6 weeks.  He also says that he had a headache which he attributed as being a side effect of the Linzess.  He says while he was on the Linzess, he had daily diarrhea.  His last dose of Linzess was on Tuesday morning.  He tells me that today while in the ER he did have a bowel movement that was loose but more formed than it had been and he did report to the nurse that he is passing quite a " bit of flatus.  He cannot think of anything that makes his pain worse or any relation to activity or food is being an exacerbating factor.  He does say the pain in his abdomen has been pretty constant since Monday evening.    His wife tells me this is the third time that he has needed an NG tube placed in the past few weeks.  The patient also tells me that he had an instance when he was on MiraLAX and after a few days he began having similar pain and ended up having an NG tube placed.  His wife disagrees and does not think the MiraLAX or Linzess caused his small bowel obstruction.  I did note whilst speaking to the patient that his urinal was full of red-colored urine and he commented that he had a whole bowl of beets for lunch today.  His wife tells me that he has been trying to eat GI soft easy to digest foods but did at 1 point have some turkey tenderloin.  He has been following recommendations and yet this keeps occurring for him so he and his wife were somewhat frustrated that this keeps occurring and that he has to have another NG tube placed.  His wife did tell me that she had requested that an MRI be ordered by his gastroenterology nurse practitioner.  She says the nurse practitioner did not recommend the MRI be ordered at that time.    He denies any fever, chills, dizziness, cold symptoms, sore throat, chest pain, palpitations, cough, shortness of breath, current nausea, vomiting, urinary issues, or lower extremity edema.  He denies any personal history of MI, CVA, seizure, blood clots, diabetes, cancer, kidney disease, or chronic   lung disease.    All 13 systems were reviewed and are within normal levels except as noted per HPI.       Past Medical History  Medical History[1]    Surgical History  Surgical History[2]     Social History  He reports that he has quit smoking. His smoking use included cigarettes. He has quit using smokeless tobacco. He reports current alcohol use. He reports that he does not  "currently use drugs after having used the following drugs: Marijuana.    Family History  Family History[3]     Allergies  Oxycodone, Tapentadol, and Nitrofurantoin macrocrystalline    Review of Systems     Physical Exam  Constitutional:       General: He is awake. He is not in acute distress.     Appearance: He is well-developed. He is not ill-appearing, toxic-appearing or diaphoretic.   HENT:      Head: Normocephalic and atraumatic.      Nose: Nose normal.      Mouth/Throat:      Mouth: Mucous membranes are moist.   Eyes:      General: No scleral icterus.  Cardiovascular:      Rate and Rhythm: Normal rate and regular rhythm.      Heart sounds: No murmur heard.  Pulmonary:      Effort: No respiratory distress.      Breath sounds: No stridor. No wheezing or rhonchi.   Abdominal:      General: There is no distension.      Palpations: Abdomen is soft.      Tenderness: There is abdominal tenderness.      Comments: Diffuse tenderness  Hypoactive bowel sounds   Genitourinary:     Comments: urinal was full of red-colored urine and he commented that he had a whole bowl of beets for lunch today  Musculoskeletal:         General: No swelling.      Cervical back: Neck supple.      Comments: Moves all extremities x4   Skin:     General: Skin is warm and dry.   Neurological:      General: No focal deficit present.      Mental Status: He is alert and oriented to person, place, and time. Mental status is at baseline.   Psychiatric:         Mood and Affect: Mood normal.         Behavior: Behavior normal. Behavior is cooperative.          Last Recorded Vitals  Visit Vitals  BP (!) 140/94 (BP Location: Left arm, Patient Position: Lying) Comment: Nurse notified   Pulse 66   Temp 36.5 °C (97.7 °F) (Temporal)   Resp 18   Ht 1.803 m (5' 11\")   Wt 82.6 kg (182 lb 1.6 oz)   SpO2 98%   BMI 25.40 kg/m²   Smoking Status Former   BSA 2.03 m²        Relevant Results  Results for orders placed or performed during the hospital encounter of " 05/28/25 (from the past 24 hours)   CBC and Auto Differential   Result Value Ref Range    WBC 7.6 4.4 - 11.3 x10*3/uL    nRBC 0.0 0.0 - 0.0 /100 WBCs    RBC 4.20 (L) 4.50 - 5.90 x10*6/uL    Hemoglobin 13.0 (L) 13.5 - 17.5 g/dL    Hematocrit 39.1 (L) 41.0 - 52.0 %    MCV 93 80 - 100 fL    MCH 31.0 26.0 - 34.0 pg    MCHC 33.2 32.0 - 36.0 g/dL    RDW 12.9 11.5 - 14.5 %    Platelets 321 150 - 450 x10*3/uL    Neutrophils % 72.1 40.0 - 80.0 %    Immature Granulocytes %, Automated 0.1 0.0 - 0.9 %    Lymphocytes % 20.0 13.0 - 44.0 %    Monocytes % 6.0 2.0 - 10.0 %    Eosinophils % 1.4 0.0 - 6.0 %    Basophils % 0.4 0.0 - 2.0 %    Neutrophils Absolute 5.50 1.20 - 7.70 x10*3/uL    Immature Granulocytes Absolute, Automated 0.01 0.00 - 0.70 x10*3/uL    Lymphocytes Absolute 1.53 1.20 - 4.80 x10*3/uL    Monocytes Absolute 0.46 0.10 - 1.00 x10*3/uL    Eosinophils Absolute 0.11 0.00 - 0.70 x10*3/uL    Basophils Absolute 0.03 0.00 - 0.10 x10*3/uL   Comprehensive metabolic panel   Result Value Ref Range    Glucose 92 74 - 99 mg/dL    Sodium 138 136 - 145 mmol/L    Potassium 4.5 3.5 - 5.3 mmol/L    Chloride 101 98 - 107 mmol/L    Bicarbonate 29 21 - 32 mmol/L    Anion Gap 13 10 - 20 mmol/L    Urea Nitrogen 14 6 - 23 mg/dL    Creatinine 0.69 0.50 - 1.30 mg/dL    eGFR >90 >60 mL/min/1.73m*2    Calcium 9.6 8.6 - 10.3 mg/dL    Albumin 4.4 3.4 - 5.0 g/dL    Alkaline Phosphatase 58 33 - 136 U/L    Total Protein 6.8 6.4 - 8.2 g/dL    AST 11 9 - 39 U/L    Bilirubin, Total 1.3 (H) 0.0 - 1.2 mg/dL    ALT 11 10 - 52 U/L   Lipase   Result Value Ref Range    Lipase 23 9 - 82 U/L   Lactate   Result Value Ref Range    Lactate 0.7 0.4 - 2.0 mmol/L   Magnesium   Result Value Ref Range    Magnesium 2.16 1.60 - 2.40 mg/dL   Urinalysis with Reflex Culture and Microscopic   Result Value Ref Range    Color, Urine Light-Brown (N) Light-Yellow, Yellow, Dark-Yellow    Appearance, Urine Clear Clear    Specific Gravity, Urine 1.048 (N) 1.005 - 1.035    pH,  Urine 7.5 5.0, 5.5, 6.0, 6.5, 7.0, 7.5, 8.0    Protein, Urine NEGATIVE NEGATIVE, 10 (TRACE), 20 (TRACE) mg/dL    Glucose, Urine Normal Normal mg/dL    Blood, Urine NEGATIVE NEGATIVE mg/dL    Ketones, Urine NEGATIVE NEGATIVE mg/dL    Bilirubin, Urine NEGATIVE NEGATIVE mg/dL    Urobilinogen, Urine Normal Normal mg/dL    Nitrite, Urine NEGATIVE NEGATIVE    Leukocyte Esterase, Urine NEGATIVE NEGATIVE        Home Medications  Prior to Admission medications    Medication Sig Start Date End Date Taking? Authorizing Provider   linaCLOtide (Linzess) 145 mcg capsule Take 1 capsule (145 mcg) by mouth once daily in the morning. Take before meals. Do not crush or chew.   Yes Historical Provider, MD   losartan (Cozaar) 100 mg tablet Take 1 tablet (100 mg) by mouth once daily. 11/26/24  Yes Jed Rojas, DO   metoprolol succinate XL (Toprol-XL) 50 mg 24 hr tablet Take 1 tablet (50 mg) by mouth once daily. Do not crush or chew. 12/26/24 12/26/25 Yes Tyrone Hardy, DO   omeprazole (PriLOSEC) 10 mg DR capsule Take 1 capsule (10 mg) by mouth every other day. Do not crush or chew.   Yes Historical Provider, MD   desipramine (Norpramin) 10 mg tablet Take 1 tablet (10 mg) by mouth once daily at bedtime.  Patient not taking: Reported on 5/28/2025 1/4/24   Pat Ribeiro MD   esomeprazole magnesium (NexIUM 24HR) 20 mg tablet,delayed release (DR/EC) Take 20 mg by mouth once daily.  Patient not taking: Reported on 5/28/2025 5/21/25 11/17/25  Nanda Johnson, IRVIN-CNP   ketoconazole (NIZOral) 2 % cream Apply once daily abdominal left foot.  Patient not taking: Reported on 5/28/2025 9/17/24   Eliot Dumont DPM   vardenafil (Levitra) 10 mg tablet Take 1 tablet (10 mg) by mouth once daily as needed for erectile dysfunction.  Patient not taking: Reported on 5/28/2025 2/21/25 3/23/25  Jed Rojas, DO   omeprazole (PriLOSEC) 20 mg DR capsule Take 1 capsule (20 mg) by mouth 2 times a day.  Patient not taking: Reported on  5/21/2025 4/20/22 5/28/25  Historical Provider, MD       Medications  Scheduled medications  Scheduled Medications[4]  Continuous medications  Continuous Medications[5]  PRN medications  dextrose, 12.5 g, q15 min PRN  dextrose, 25 g, q15 min PRN  diphenhydrAMINE, 25 mg, Nightly PRN  glucagon, 1 mg, q15 min PRN  glucagon, 1 mg, q15 min PRN  phenoL, 1 spray, q2h PRN  prochlorperazine, 10 mg, q6h PRN        Imaging  Imaging  XR abdomen 1 view  Result Date: 5/28/2025  1.  Enteric tube terminates at the left upper quadrant, at the expected location of the gastric fundus.     MACRO: None   Signed by: Kerri Pelletier 5/28/2025 9:18 PM Dictation workstation:   [id number]    CT abdomen pelvis w IV contrast  Result Date: 5/28/2025  1.  Multiple fluid-filled dilated small bowel loops, measuring up to 3.5 cm with transition point in the mid abdomen, compatible with acute small bowel obstruction. No pneumoperitoneum or ascites. 2. Other stable findings as described above.     Signed by: Duke Soriano 5/28/2025 5:54 PM Dictation workstation:   [id number]      Cardiology, Vascular, and Other Imaging  No other imaging results found for the past 7 days         Assessment/Plan   Assessment & Plan  SBO (small bowel obstruction) (Multi)  Abdominal pain  Acute small bowel obstruction   Livers cysts (larges in right lobe 6.1 cm), not new per pt & wife  2.2 cm hypervascular lesion upper pole of spleen/likely hemangioma stable since 2024    Plan:  General surgery consult   GI consult Dr Mary Jane Joshi per patient request (she is his gastroenterologist)   AM labs   NPO   NG tube   KUB  Convert PO meds to IV if able   Case discussed with Dr Guardado    VTE prophylaxis:   DVT prophylaxis: SCDs    Code Status  Full code      Plan to resume home medications as appropriate when list is available from pharmacy, see orders for additional plan elements, management deferred to attending and consult physicians.     I spent a total of 65 minutes on  the date of the service which included preparing to see the patient, face-to-face patient care, completing clinical documentation, obtaining and/or reviewing separately obtained history, performing a medically appropriate examination, counseling and educating the patient/family/caregiver, ordering medications, tests, or procedures, communicating with other HCPs (not separately reported), independently interpreting results (not separately reported), communicating results to the patient/family/caregiver, and care coordination (not separately reported).     Daniel BESSFERNANDO  [facility] [phone]  Attending physician KINGA Espinoza MD     This note has been transcribed using Dragon voice recognition system and there is a possibility of unintentional typing misprints.  Any information found to be copied from previous providers is done in the best interest of the patient to provide accurate, quality, and continuity of care.     Plan was explained to patient, and the patient verbalized understanding and agreement with the plan.           *Please note this report has been produced using speech recognition software and may contain errors related to that system including grammar, punctuation and spelling as well as words and phrases that may be inappropriate. If there are questions or concerns, please feel free to contact me to clarify.           [1]   Past Medical History:  Diagnosis Date    Acute appendicitis with rupture     Pickett esophagus     Carpal tunnel syndrome     Cervical spondylosis     Former smoker     GERD (gastroesophageal reflux disease)     Hiatal hernia     HTN (hypertension)     Liver cyst     Lumbosacral radiculopathy     Pancreatic cyst ([facility])     Personal history of other diseases of the circulatory system     History of hypertension    PMR (polymyalgia rheumatica) (Multi)     Small bowel obstruction (Multi)     Tinea pedis of left foot    [2]   Past Surgical History:  Procedure  Laterality Date    APPENDECTOMY      laparoscopic done in [facility] in April 2025    COLONOSCOPY  02/19/2014    Complete Colonoscopy    ESOPHAGOGASTRODUODENOSCOPY  02/19/2014    Diagnostic Esophagogastroduodenoscopy    KNEE ARTHROPLASTY Right     OTHER SURGICAL HISTORY Right 01/06/2023    Shoulder surgery/scope    OTHER SURGICAL HISTORY  11/12/2019    Knee surgery   [3]   Family History  Problem Relation Name Age of Onset    Pancreatic cancer Father  60   [4] ketorolac, 15 mg, intravenous, Once  [Held by provider] losartan, 100 mg, oral, Daily  [Held by provider] metoprolol succinate XL, 50 mg, oral, Daily  metoprolol, 5 mg, intravenous, q6h  [START ON 5/29/2025] pantoprazole, 40 mg, oral, Daily before breakfast   Or  [START ON 5/29/2025] pantoprazole, 40 mg, intravenous, Daily before breakfast     [5] sodium chloride 0.9%, 75 mL/hr

## 2025-05-29 NOTE — PROGRESS NOTES
05/29/25 1140   Discharge Planning   Living Arrangements Spouse/significant other   Support Systems Spouse/significant other   Assistance Needed None   Type of Residence Private residence   Home or Post Acute Services None   Expected Discharge Disposition Home   Does the patient need discharge transport arranged? No   Financial Resource Strain   How hard is it for you to pay for the very basics like food, housing, medical care, and heating? Not hard   Stroke Family Assessment   Stroke Family Assessment Needed No   Intensity of Service   Intensity of Service 0-30 min     Met with pt and wife at bedside, pt states we can speak freely. Pt lives at home with his spouse and is independent. PCP is Dr. Rojas and prescriptions are filled at [facility] with no barriers noted. Denies difficulty with living expenses such as utilities/groceries/prescriptions. Has a ride home upon dc. Care Transitions will follow along for any home going needs.

## 2025-05-30 ENCOUNTER — TELEPHONE (OUTPATIENT)
Dept: GASTROENTEROLOGY | Facility: CLINIC | Age: 65
End: 2025-05-30
Payer: COMMERCIAL

## 2025-05-30 VITALS
TEMPERATURE: 97.2 F | WEIGHT: 181 LBS | OXYGEN SATURATION: 98 % | HEART RATE: 56 BPM | SYSTOLIC BLOOD PRESSURE: 164 MMHG | BODY MASS INDEX: 25.34 KG/M2 | DIASTOLIC BLOOD PRESSURE: 106 MMHG | HEIGHT: 71 IN | RESPIRATION RATE: 19 BRPM

## 2025-05-30 LAB
ANION GAP SERPL CALC-SCNC: 12 MMOL/L (ref 10–20)
BUN SERPL-MCNC: 15 MG/DL (ref 6–23)
CALCIUM SERPL-MCNC: 9.2 MG/DL (ref 8.6–10.3)
CHLORIDE SERPL-SCNC: 103 MMOL/L (ref 98–107)
CO2 SERPL-SCNC: 28 MMOL/L (ref 21–32)
CREAT SERPL-MCNC: 0.73 MG/DL (ref 0.5–1.3)
EGFRCR SERPLBLD CKD-EPI 2021: >90 ML/MIN/1.73M*2
ERYTHROCYTE [DISTWIDTH] IN BLOOD BY AUTOMATED COUNT: 12.5 % (ref 11.5–14.5)
GLUCOSE SERPL-MCNC: 74 MG/DL (ref 74–99)
HCT VFR BLD AUTO: 38.3 % (ref 41–52)
HGB BLD-MCNC: 12.6 G/DL (ref 13.5–17.5)
MCH RBC QN AUTO: 30.8 PG (ref 26–34)
MCHC RBC AUTO-ENTMCNC: 32.9 G/DL (ref 32–36)
MCV RBC AUTO: 94 FL (ref 80–100)
NRBC BLD-RTO: 0 /100 WBCS (ref 0–0)
PLATELET # BLD AUTO: 262 X10*3/UL (ref 150–450)
POTASSIUM SERPL-SCNC: 4.1 MMOL/L (ref 3.5–5.3)
RBC # BLD AUTO: 4.09 X10*6/UL (ref 4.5–5.9)
SODIUM SERPL-SCNC: 139 MMOL/L (ref 136–145)
WBC # BLD AUTO: 5.7 X10*3/UL (ref 4.4–11.3)

## 2025-05-30 PROCEDURE — 99231 SBSQ HOSP IP/OBS SF/LOW 25: CPT | Performed by: PHYSICIAN ASSISTANT

## 2025-05-30 PROCEDURE — 2500000004 HC RX 250 GENERAL PHARMACY W/ HCPCS (ALT 636 FOR OP/ED): Mod: JZ | Performed by: PHYSICIAN ASSISTANT

## 2025-05-30 PROCEDURE — 36415 COLL VENOUS BLD VENIPUNCTURE: CPT | Performed by: PHYSICIAN ASSISTANT

## 2025-05-30 PROCEDURE — 80048 BASIC METABOLIC PNL TOTAL CA: CPT | Performed by: PHYSICIAN ASSISTANT

## 2025-05-30 PROCEDURE — 85027 COMPLETE CBC AUTOMATED: CPT | Performed by: PHYSICIAN ASSISTANT

## 2025-05-30 PROCEDURE — 2500000001 HC RX 250 WO HCPCS SELF ADMINISTERED DRUGS (ALT 637 FOR MEDICARE OP): Performed by: PHYSICIAN ASSISTANT

## 2025-05-30 RX ORDER — POLYETHYLENE GLYCOL 3350 17 G/17G
17 POWDER, FOR SOLUTION ORAL DAILY
Qty: 30 PACKET | Refills: 0 | Status: SHIPPED | OUTPATIENT
Start: 2025-05-30 | End: 2025-06-29

## 2025-05-30 RX ADMIN — PANTOPRAZOLE SODIUM 40 MG: 40 TABLET, DELAYED RELEASE ORAL at 07:39

## 2025-05-30 RX ADMIN — METOPROLOL TARTRATE 5 MG: 5 INJECTION INTRAVENOUS at 09:03

## 2025-05-30 ASSESSMENT — PAIN - FUNCTIONAL ASSESSMENT
PAIN_FUNCTIONAL_ASSESSMENT: 0-10
PAIN_FUNCTIONAL_ASSESSMENT: 0-10

## 2025-05-30 ASSESSMENT — COGNITIVE AND FUNCTIONAL STATUS - GENERAL
MOBILITY SCORE: 24
DAILY ACTIVITIY SCORE: 24

## 2025-05-30 ASSESSMENT — PAIN SCALES - GENERAL
PAINLEVEL_OUTOF10: 4
PAINLEVEL_OUTOF10: 0 - NO PAIN

## 2025-05-30 NOTE — NURSING NOTE
0815 pt c/o 4/10 abdominal pain. Denies wanting pain meds at this time. Pt waiting for breakfast.   1130 IV and tele removed. Discharge instructions reviewed. Pt sts understanding.

## 2025-05-30 NOTE — DISCHARGE SUMMARY
Discharge Diagnosis  SBO (small bowel obstruction) (Multi)           Issues Requiring Follow-Up  Discharge patient home with follow-up with gastroenterology and surgery    Discharge Meds     Medication List      CONTINUE taking these medications     Linzess 145 mcg capsule; Generic drug: linaCLOtide   losartan 100 mg tablet; Commonly known as: Cozaar; Take 1 tablet (100   mg) by mouth once daily.   metoprolol succinate XL 50 mg 24 hr tablet; Commonly known as:   Toprol-XL; Take 1 tablet (50 mg) by mouth once daily. Do not crush or   chew.   omeprazole 10 mg DR capsule; Commonly known as: PriLOSEC     STOP taking these medications     desipramine 10 mg tablet; Commonly known as: Norpramin   esomeprazole magnesium 20 mg tablet,delayed release (DR/EC); Commonly   known as: NexIUM 24HR   ketoconazole 2 % cream; Commonly known as: NIZOral   vardenafil 10 mg tablet; Commonly known as: Levitra       Test Results Pending At Discharge  Pending Labs       No current pending labs.            Hospital Course   Patient was admitted with abdominal pain nausea vomiting patient was diagnosed with acute small bowel obstruction NG tube was put in with some drainage patient started passing gas and had a bowel movement yesterday patient seen by surgery and gastroenterology and the plan to discharge him with close follow-up as an outpatient    Pertinent Physical Exam At Time of Discharge  Physical Exam  HENT:      Right Ear: External ear normal.      Left Ear: External ear normal.      Mouth/Throat:      Mouth: Mucous membranes are moist.   Cardiovascular:      Rate and Rhythm: Normal rate and regular rhythm.      Heart sounds: No murmur heard.     No friction rub. No gallop.   Pulmonary:      Effort: No accessory muscle usage or respiratory distress.      Breath sounds: No stridor. No wheezing or rhonchi.   Chest:      Chest wall: No tenderness.   Abdominal:      General: There is no distension.      Palpations: There is no mass.       Tenderness: There is no abdominal tenderness. There is no guarding or rebound.   Musculoskeletal:         General: No deformity or signs of injury.      Cervical back: No rigidity or tenderness. Normal range of motion.      Right lower leg: No edema.      Left lower leg: No edema.   Skin:     Coloration: Skin is not jaundiced or pale.      Findings: No lesion.   Neurological:      General: No focal deficit present.      Mental Status: He is alert, oriented to person, place, and time and easily aroused.      Cranial Nerves: No cranial nerve deficit.      Sensory: No sensory deficit.      Motor: No weakness.         Outpatient Follow-Up  Future Appointments   Date Time Provider Department Center   6/3/2025  8:45 AM [facility] MOBILE MRI [id number] [facility]   6/3/2025  9:45 AM [facility]1200 CT 1 [id number] [facility]   6/9/2025  9:00 AM Mago Palomo MD [id number] Ok Espinoza MD

## 2025-05-30 NOTE — CARE PLAN
The patient's goals for the shift include      The clinical goals for the shift include pt will  have decreased abdominal pain this shift    Over the shift, the patient did  make progress toward the following goals.

## 2025-05-30 NOTE — PROGRESS NOTES
Jenaro Belcher is a 64 y.o. male on day 2 of admission presenting with SBO (small bowel obstruction) (Multi).      Subjective   Patient seen and examined with Dr. Guardado this morning his NG is out his abdomen is flat soft and nontender he is sitting on the side of the bed working on Medabils on his night stand table.  He has no nausea, he had a loose bowel movement.  He has no urinary complaints.       Objective     Last Recorded Vitals  BP (!) 164/106 (BP Location: Left arm, Patient Position: Sitting)   Pulse 56   Temp 36.2 °C (97.2 °F) (Temporal)   Resp 19   Wt 82.1 kg (181 lb)   SpO2 98%   Intake/Output last 3 Shifts:    Intake/Output Summary (Last 24 hours) at 5/30/2025 1028  Last data filed at 5/29/2025 2000  Gross per 24 hour   Intake 1502.5 ml   Output --   Net 1502.5 ml       Admission Weight  Weight: 83 kg (183 lb) (05/28/25 1502)    Daily Weight  05/30/25 : 82.1 kg (181 lb)    Image Results  ECG 12 lead  Sinus bradycardia with 1st degree AV block  Otherwise normal ECG  No previous ECGs available  XR abdomen 1 view  Narrative: Interpreted By:  Duke Soriano,   STUDY:  XR ABDOMEN 1 VIEW;  5/29/2025 8:38 am      INDICATION:  Signs/Symptoms:SBO follow up.      COMPARISON:  CT abdomen and pelvis and KUB 05/28/2025      ACCESSION NUMBER(S):  [id number]      ORDERING CLINICIAN:  MAHAD ABRAMS      FINDINGS:  Stable feeding tube. Interval improvement previously noted dilated  small bowel loops. Large colonic stool. Limited evaluation of  pneumoperitoneum on supine imaging, however no gross evidence of free  air is noted.      Visualized lungs are clear.      Osseous structures demonstrate no acute bony changes.      Impression: 1.  See above      Signed by: Duke Soriano 5/29/2025 8:42 AM  Dictation workstation:   [id number]      Physical Exam  Alert and oriented, no acute distress, 64-year-old male  Afebrile vital signs are stable  Lungs are clear bilaterally heart is regular rate and  rhythm  Abdomen is soft, nontender, no rebound or guarding present.  Relevant Results                              Assessment & Plan  SBO (small bowel obstruction) (Multi)    #1 partial small bowel obstruction resolved  2.  Status post laparoscopic appendectomy 6 weeks ago.  He is okay for discharge from surgery point of view he was given instructions to keep his diet to full liquids to soft foods for at least 14 days and then progress back towards his normal diet he has to follow-up with Dr. Guardado in 2 weeks.           Eleno Romero PA-C

## 2025-05-30 NOTE — NURSING NOTE
Patient alert and oriented x4 and ambulatory in room. Patient had some abdominal pain but able to reposition for comfort. Patient independent with toileting. Patient tolerating clear liq diet this shift. No acute changes this shift

## 2025-05-30 NOTE — CARE PLAN
The patient's goals for the shift include      The clinical goals for the shift include see poc      Problem: Pain - Adult  Goal: Verbalizes/displays adequate comfort level or baseline comfort level  Outcome: Progressing     Problem: Safety - Adult  Goal: Free from fall injury  Outcome: Progressing     Problem: Discharge Planning  Goal: Discharge to home or other facility with appropriate resources  Outcome: Progressing     Problem: Chronic Conditions and Co-morbidities  Goal: Patient's chronic conditions and co-morbidity symptoms are monitored and maintained or improved  Outcome: Progressing     Problem: Nutrition  Goal: Nutrient intake appropriate for maintaining nutritional needs  Outcome: Progressing     Problem: Pain  Goal: Takes deep breaths with improved pain control throughout the shift  Outcome: Progressing  Goal: Turns in bed with improved pain control throughout the shift  Outcome: Progressing  Goal: Walks with improved pain control throughout the shift  Outcome: Progressing  Goal: Performs ADL's with improved pain control throughout shift  Outcome: Progressing  Goal: Participates in PT with improved pain control throughout the shift  Outcome: Progressing  Goal: Free from opioid side effects throughout the shift  Outcome: Progressing  Goal: Free from acute confusion related to pain meds throughout the shift  Outcome: Progressing

## 2025-06-02 ENCOUNTER — PATIENT OUTREACH (OUTPATIENT)
Dept: PRIMARY CARE | Facility: CLINIC | Age: 65
End: 2025-06-02
Payer: COMMERCIAL

## 2025-06-03 ENCOUNTER — HOSPITAL ENCOUNTER (OUTPATIENT)
Dept: RADIOLOGY | Facility: CLINIC | Age: 65
Discharge: HOME | End: 2025-06-03
Payer: COMMERCIAL

## 2025-06-03 DIAGNOSIS — K56.600 PARTIAL INTESTINAL OBSTRUCTION, UNSPECIFIED CAUSE (MULTI): ICD-10-CM

## 2025-06-03 DIAGNOSIS — K86.2 CYST OF PANCREAS (HHS-HCC): ICD-10-CM

## 2025-06-03 DIAGNOSIS — K59.09 CHRONIC CONSTIPATION WITH OVERFLOW: ICD-10-CM

## 2025-06-03 DIAGNOSIS — K86.89 MASS OF HEAD OF PANCREAS (HHS-HCC): ICD-10-CM

## 2025-06-03 LAB
ATRIAL RATE: 58 BPM
P AXIS: 65 DEGREES
P OFFSET: 185 MS
P ONSET: 119 MS
PR INTERVAL: 210 MS
Q ONSET: 224 MS
QRS COUNT: 9 BEATS
QRS DURATION: 90 MS
QT INTERVAL: 414 MS
QTC CALCULATION(BAZETT): 406 MS
QTC FREDERICIA: 409 MS
R AXIS: 42 DEGREES
T AXIS: 38 DEGREES
T OFFSET: 431 MS
VENTRICULAR RATE: 58 BPM

## 2025-06-03 PROCEDURE — 74183 MRI ABD W/O CNTR FLWD CNTR: CPT

## 2025-06-03 PROCEDURE — A9575 INJ GADOTERATE MEGLUMI 0.1ML: HCPCS

## 2025-06-03 PROCEDURE — 2550000001 HC RX 255 CONTRASTS

## 2025-06-03 PROCEDURE — 74174 CTA ABD&PLVS W/CONTRAST: CPT | Performed by: STUDENT IN AN ORGANIZED HEALTH CARE EDUCATION/TRAINING PROGRAM

## 2025-06-03 PROCEDURE — 74174 CTA ABD&PLVS W/CONTRAST: CPT

## 2025-06-03 RX ORDER — GADOTERATE MEGLUMINE 376.9 MG/ML
17 INJECTION INTRAVENOUS
Status: COMPLETED | OUTPATIENT
Start: 2025-06-03 | End: 2025-06-03

## 2025-06-03 RX ADMIN — IOHEXOL 75 ML: 350 INJECTION, SOLUTION INTRAVENOUS at 09:33

## 2025-06-03 RX ADMIN — GADOTERATE MEGLUMINE 17 ML: 376.9 INJECTION INTRAVENOUS at 08:57

## 2025-06-03 NOTE — PROGRESS NOTES
Discharge Facility: [facility]  Discharge Diagnosis: small bowel obstruction  Admission Date: 5/28/25  Discharge Date: 5/30/25    PCP Appointment Date: Declined  Specialist Appointment Date: 6/9 GI  Hospital Encounter and Summary Linked: Yes  ED to Hosp-Admission (Discharged) with KINGA Espinoza MD; Zane Landeros MD (05/28/2025)   See discharge assessment below for further details    Wrap Up  Wrap Up Additional Comments: Successful transition of care outreach with patient. Patient reports doing well at home since discharge. New meds/changes reviewed with patient during outreach. Patient denies CP/SOB/abdominal pain, [place] now having follow up scans per GI and will see GI. Patient denies further discharge questions/concerns/needs at time of outreach call. Emphasized that follow up appts are needed after discharge with PCP and reviewed needed follow ups with any specialties to assess response to treatment from hospitalization. Patient aware of my availability for non-emergent concerns. (6/3/2025  8:53 AM)  Call End Time: 1142 (6/3/2025  8:53 AM)    Engagement  Call Start Time: 1137 (6/3/2025  8:53 AM)    Medications  Medications reviewed with patient/caregiver?: Yes (6/3/2025  8:53 AM)  Is the patient having any side effects they believe may be caused by any medication additions or changes?: No (6/3/2025  8:53 AM)  Does the patient have all medications ordered at discharge?: Yes (6/3/2025  8:53 AM)  Care Management Interventions: Provided patient education (6/3/2025  8:53 AM)  Prescription Comments: no changes to medications (6/3/2025  8:53 AM)  Is the patient taking all medications as directed (includes completed medication regime)?: Yes (6/3/2025  8:53 AM)  Care Management Interventions: Provided patient education (6/3/2025  8:53 AM)  Medication Comments: No issues with medications (6/3/2025  8:53 AM)    Appointments  Does the patient have a primary care provider?: Yes (6/3/2025   8:53 AM)  Care Management Interventions: -- (Declined appt at this time) (6/3/2025  8:53 AM)  Has the patient kept scheduled appointments due by today?: Yes (6/3/2025  8:53 AM)  Care Management Interventions: Advised patient to keep appointment (6/3/2025  8:53 AM)    Patient Teaching  Does the patient have access to their discharge instructions?: Yes (6/3/2025  8:53 AM)  Care Management Interventions: Reviewed instructions with patient (6/3/2025  8:53 AM)  What is the patient's perception of their health status since discharge?: Returned to baseline/stable (6/3/2025  8:53 AM)  Is the patient/caregiver able to teach back the hierarchy of who to call/visit for symptoms/problems? PCP, Specialist, Home Health nurse, Urgent Care, ED, 911: Yes (6/3/2025  8:53 AM)  Patient/Caregiver Education Comments: Aware to seek care with any worsening abd pain (6/3/2025  8:53 AM)

## 2025-06-04 ENCOUNTER — APPOINTMENT (OUTPATIENT)
Dept: PRIMARY CARE | Facility: CLINIC | Age: 65
End: 2025-06-04
Payer: COMMERCIAL

## 2025-06-09 ENCOUNTER — APPOINTMENT (OUTPATIENT)
Dept: GASTROENTEROLOGY | Facility: CLINIC | Age: 65
End: 2025-06-09
Payer: COMMERCIAL

## 2025-06-09 VITALS
OXYGEN SATURATION: 97 % | BODY MASS INDEX: 26.46 KG/M2 | HEART RATE: 64 BPM | WEIGHT: 189 LBS | DIASTOLIC BLOOD PRESSURE: 97 MMHG | SYSTOLIC BLOOD PRESSURE: 158 MMHG | RESPIRATION RATE: 18 BRPM | HEIGHT: 71 IN

## 2025-06-09 DIAGNOSIS — K59.00 CONSTIPATION, UNSPECIFIED CONSTIPATION TYPE: Primary | ICD-10-CM

## 2025-06-09 DIAGNOSIS — Z86.0100 HISTORY OF COLON POLYPS: ICD-10-CM

## 2025-06-09 DIAGNOSIS — K22.70 BARRETT'S ESOPHAGUS WITHOUT DYSPLASIA: ICD-10-CM

## 2025-06-09 DIAGNOSIS — D49.0 IPMN (INTRADUCTAL PAPILLARY MUCINOUS NEOPLASM): ICD-10-CM

## 2025-06-09 PROCEDURE — 3008F BODY MASS INDEX DOCD: CPT | Performed by: INTERNAL MEDICINE

## 2025-06-09 PROCEDURE — 99215 OFFICE O/P EST HI 40 MIN: CPT | Performed by: INTERNAL MEDICINE

## 2025-06-09 PROCEDURE — 3077F SYST BP >= 140 MM HG: CPT | Performed by: INTERNAL MEDICINE

## 2025-06-09 PROCEDURE — 3080F DIAST BP >= 90 MM HG: CPT | Performed by: INTERNAL MEDICINE

## 2025-06-09 RX ORDER — ADHESIVE BANDAGE
30 BANDAGE TOPICAL DAILY
Status: SHIPPED | OUTPATIENT
Start: 2025-06-09

## 2025-06-09 NOTE — H&P (VIEW-ONLY)
REASON FOR VISIT:  Hx of recent SBO, Pancreatic cyst/IPMN, Constipation, hx of adam's, esophagus, colon polyp    HPI:  Jenaro Belcher is a 64 y.o. male who presents for above issues.  Recently was hospitalized for small bowel obstruction, after tolerating diet discharged home.  Of note patient had acute appendicitis while he was on the cruise, had appendectomy after 24 hours overseas ([facility]) on 04/11/2025.  During hospital admission CT abdomen pelvis showed:  1.  Multiple fluid-filled dilated small bowel loops, measuring up to  3.5 cm with transition point in the mid abdomen, compatible with  acute small bowel obstruction. No pneumoperitoneum or ascites.  2. Other stable findings as described above.  He was received conservative treatment, follow-up KUB on 05/29/2025 showed:  Stable feeding tube. Interval improvement previously noted dilated  small bowel loops. Large colonic stool. Limited evaluation of  pneumoperitoneum on supine imaging, however no gross evidence of free  air is noted.      Visualized lungs are clear.      Osseous structures demonstrate no acute bony changes.      He was discharged home advised to continue have full liquid to mechanical soft diet.  Plenty of fiber and laxative.  Today he does not have any abdominal pain denies any abdominal distention, still having constipation intermittently, taking laxative as needed.  History of small sidebranch IPMN, he is scheduled for follow-up MRI MRCP as outpatient.  Last EGD EUS on September 2024 showed C2 M2 Adam's esophagus biopsy did not show any acute abnormality.  EUS showed a small 6 x 5 mm round pancreatic cyst.  50 x 40 mm simple liver cyst.  Was advised to have a follow-up MRI MRCP.  His labs reviewed    REVIEW OF SYSTEMS  Review of Systems   Constitutional: Negative.  Negative for activity change, appetite change, chills, fatigue, fever and unexpected weight change.   HENT: Negative.     Respiratory: Negative.     Cardiovascular:  "Negative.  Negative for chest pain and palpitations.   Gastrointestinal: Negative.  Negative for abdominal distention, abdominal pain, anal bleeding, blood in stool, constipation, diarrhea, nausea, rectal pain and vomiting.   Skin: Negative.  Negative for color change and rash.   Neurological: Negative.  Negative for dizziness, tremors, seizures, weakness and headaches.   Psychiatric/Behavioral: Negative.  Negative for confusion.       Allergies[1]    Medical History[2]    Surgical History[3]    Family History[4]    Social History     Tobacco Use    Smoking status: Former     Types: Cigarettes    Smokeless tobacco: Former   Substance Use Topics    Alcohol use: Yes     Comment: weekends       Current Medications[5]    PHYSICAL EXAM:  BP (!) 158/97   Pulse 64   Resp 18   Ht 1.803 m (5' 11\")   Wt 85.7 kg (189 lb)   SpO2 97%   BMI 26.36 kg/m²    General appearance: No acute distress, cooperative.  Eyes: Nonicteric, no redness or proptosis  Ears, nose, mouth, and throat: Moist mucous membranes, tongue normal  Neck: Supple, no lymphadenopathy or thyromegaly  Lungs: Clear to auscultation bilaterally  CV: Regular rate and rhythm, no murmur; no pitting edema in the lower extremities  Abd: soft, non-tender; non-distended; normal active bowel sounds; NO scars  Skin: No rashes or lesions; no liver stigmata  MSK: No deformities or joint edema/redness/tenderness  Neuro: Alert and oriented ×3, normal gait, non-focal NO dysphagia   Lab Results   Component Value Date    WBC 5.7 05/30/2025    HGB 12.6 (L) 05/30/2025    HCT 38.3 (L) 05/30/2025    MCV 94 05/30/2025     05/30/2025       Lab Results   Component Value Date    ALT 8 (L) 05/29/2025    AST 9 05/29/2025    ALKPHOS 46 05/29/2025    BILITOT 1.5 (H) 05/29/2025     No results found for: \"INR\", \"PROTIME\"    Lab Results   Component Value Date    IRON 107 10/22/2019    TIBC 385 10/22/2019    FERRITIN 95 10/22/2019        ASSESSMENT&PLAN:  Status post appendectomy, small " bowel obstruction, chronic constipation.  He has a risk for recurrent small bowel obstruction due to possible intestinal adhesion  We had a detailed discussion about the diet plenty of p.o. hydration and fibers  MiraLAX 17 g p.o. 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 Jed Rojas DO.   My final recommendations will be communicated back to the requesting physician by way of shared Medical record or letter to requesting physician via fax.          Signature: Mago Palomo MD    Date: 6/9/2025  Time: 9:15 AM          [1]   Allergies  Allergen Reactions    Oxycodone Other     Severe nausea    Tapentadol Rash    Nitrofurantoin Macrocrystalline Hives   [2]   Past Medical History:  Diagnosis Date    Acute appendicitis with rupture     Pickett esophagus     Carpal tunnel syndrome     Cervical spondylosis     Former smoker     GERD (gastroesophageal reflux disease)     Hiatal hernia     HTN (hypertension)     Liver cyst     Lumbosacral radiculopathy     Pancreatic cyst (HHS-HCC)     Personal history of other diseases of the circulatory system     History of hypertension    PMR (polymyalgia rheumatica) (Multi)     Small bowel obstruction (Multi)     Tinea pedis of left foot    [3]   Past Surgical History:  Procedure Laterality Date    APPENDECTOMY      laparoscopic done in [facility] in April 2025    COLONOSCOPY  02/19/2014    Complete Colonoscopy    ESOPHAGOGASTRODUODENOSCOPY  02/19/2014    Diagnostic Esophagogastroduodenoscopy    KNEE ARTHROPLASTY Right     OTHER SURGICAL HISTORY Right 01/06/2023    Shoulder surgery/scope    OTHER SURGICAL HISTORY  11/12/2019    Knee surgery   [4]   Family  History  Problem Relation Name Age of Onset    Pancreatic cancer Father  60   [5]   Current Outpatient Medications   Medication Sig Dispense Refill    losartan (Cozaar) 100 mg tablet Take 1 tablet (100 mg) by mouth once daily. 90 tablet 1    metoprolol succinate XL (Toprol-XL) 50 mg 24 hr tablet Take 1 tablet (50 mg) by mouth once daily. Do not crush or chew. 90 tablet 3    linaCLOtide (Linzess) 145 mcg capsule Take 1 capsule (145 mcg) by mouth once daily in the morning. Take before meals. Do not crush or chew. (Patient not taking: Reported on 6/9/2025)      omeprazole (PriLOSEC) 10 mg DR capsule Take 1 capsule (10 mg) by mouth every other day. Do not crush or chew. (Patient not taking: Reported on 6/9/2025)      polyethylene glycol (Glycolax, Miralax) 17 gram packet Take 17 g by mouth once daily. (Patient not taking: Reported on 6/9/2025) 30 packet 0     No current facility-administered medications for this visit.

## 2025-06-09 NOTE — PROGRESS NOTES
REASON FOR VISIT:  Hx of recent SBO, Pancreatic cyst/IPMN, Constipation, hx of adam's, esophagus, colon polyp    HPI:  Jenaro Belcher is a 64 y.o. male who presents for above issues.  Recently was hospitalized for small bowel obstruction, after tolerating diet discharged home.  Of note patient had acute appendicitis while he was on the cruise, had appendectomy after 24 hours overseas ([facility]) on 04/11/2025.  During hospital admission CT abdomen pelvis showed:  1.  Multiple fluid-filled dilated small bowel loops, measuring up to  3.5 cm with transition point in the mid abdomen, compatible with  acute small bowel obstruction. No pneumoperitoneum or ascites.  2. Other stable findings as described above.  He was received conservative treatment, follow-up KUB on 05/29/2025 showed:  Stable feeding tube. Interval improvement previously noted dilated  small bowel loops. Large colonic stool. Limited evaluation of  pneumoperitoneum on supine imaging, however no gross evidence of free  air is noted.      Visualized lungs are clear.      Osseous structures demonstrate no acute bony changes.      He was discharged home advised to continue have full liquid to mechanical soft diet.  Plenty of fiber and laxative.  Today he does not have any abdominal pain denies any abdominal distention, still having constipation intermittently, taking laxative as needed.  History of small sidebranch IPMN, he is scheduled for follow-up MRI MRCP as outpatient.  Last EGD EUS on September 2024 showed C2 M2 Adam's esophagus biopsy did not show any acute abnormality.  EUS showed a small 6 x 5 mm round pancreatic cyst.  50 x 40 mm simple liver cyst.  Was advised to have a follow-up MRI MRCP.  His labs reviewed    REVIEW OF SYSTEMS  Review of Systems   Constitutional: Negative.  Negative for activity change, appetite change, chills, fatigue, fever and unexpected weight change.   HENT: Negative.     Respiratory: Negative.     Cardiovascular:  "Negative.  Negative for chest pain and palpitations.   Gastrointestinal: Negative.  Negative for abdominal distention, abdominal pain, anal bleeding, blood in stool, constipation, diarrhea, nausea, rectal pain and vomiting.   Skin: Negative.  Negative for color change and rash.   Neurological: Negative.  Negative for dizziness, tremors, seizures, weakness and headaches.   Psychiatric/Behavioral: Negative.  Negative for confusion.       Allergies[1]    Medical History[2]    Surgical History[3]    Family History[4]    Social History     Tobacco Use    Smoking status: Former     Types: Cigarettes    Smokeless tobacco: Former   Substance Use Topics    Alcohol use: Yes     Comment: weekends       Current Medications[5]    PHYSICAL EXAM:  BP (!) 158/97   Pulse 64   Resp 18   Ht 1.803 m (5' 11\")   Wt 85.7 kg (189 lb)   SpO2 97%   BMI 26.36 kg/m²    General appearance: No acute distress, cooperative.  Eyes: Nonicteric, no redness or proptosis  Ears, nose, mouth, and throat: Moist mucous membranes, tongue normal  Neck: Supple, no lymphadenopathy or thyromegaly  Lungs: Clear to auscultation bilaterally  CV: Regular rate and rhythm, no murmur; no pitting edema in the lower extremities  Abd: soft, non-tender; non-distended; normal active bowel sounds; NO scars  Skin: No rashes or lesions; no liver stigmata  MSK: No deformities or joint edema/redness/tenderness  Neuro: Alert and oriented ×3, normal gait, non-focal NO dysphagia   Lab Results   Component Value Date    WBC 5.7 05/30/2025    HGB 12.6 (L) 05/30/2025    HCT 38.3 (L) 05/30/2025    MCV 94 05/30/2025     05/30/2025       Lab Results   Component Value Date    ALT 8 (L) 05/29/2025    AST 9 05/29/2025    ALKPHOS 46 05/29/2025    BILITOT 1.5 (H) 05/29/2025     No results found for: \"INR\", \"PROTIME\"    Lab Results   Component Value Date    IRON 107 10/22/2019    TIBC 385 10/22/2019    FERRITIN 95 10/22/2019        ASSESSMENT&PLAN:  Status post appendectomy, small " bowel obstruction, chronic constipation.  He has a risk for recurrent small bowel obstruction due to possible intestinal adhesion  We had a detailed discussion about the diet plenty of p.o. hydration and fibers  MiraLAX 17 g p.o. 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 Jed Rojas DO.   My final recommendations will be communicated back to the requesting physician by way of shared Medical record or letter to requesting physician via fax.          Signature: Mago Palomo MD    Date: 6/9/2025  Time: 9:15 AM          [1]   Allergies  Allergen Reactions    Oxycodone Other     Severe nausea    Tapentadol Rash    Nitrofurantoin Macrocrystalline Hives   [2]   Past Medical History:  Diagnosis Date    Acute appendicitis with rupture     Pickett esophagus     Carpal tunnel syndrome     Cervical spondylosis     Former smoker     GERD (gastroesophageal reflux disease)     Hiatal hernia     HTN (hypertension)     Liver cyst     Lumbosacral radiculopathy     Pancreatic cyst (HHS-HCC)     Personal history of other diseases of the circulatory system     History of hypertension    PMR (polymyalgia rheumatica) (Multi)     Small bowel obstruction (Multi)     Tinea pedis of left foot    [3]   Past Surgical History:  Procedure Laterality Date    APPENDECTOMY      laparoscopic done in [facility] in April 2025    COLONOSCOPY  02/19/2014    Complete Colonoscopy    ESOPHAGOGASTRODUODENOSCOPY  02/19/2014    Diagnostic Esophagogastroduodenoscopy    KNEE ARTHROPLASTY Right     OTHER SURGICAL HISTORY Right 01/06/2023    Shoulder surgery/scope    OTHER SURGICAL HISTORY  11/12/2019    Knee surgery   [4]   Family  History  Problem Relation Name Age of Onset    Pancreatic cancer Father  60   [5]   Current Outpatient Medications   Medication Sig Dispense Refill    losartan (Cozaar) 100 mg tablet Take 1 tablet (100 mg) by mouth once daily. 90 tablet 1    metoprolol succinate XL (Toprol-XL) 50 mg 24 hr tablet Take 1 tablet (50 mg) by mouth once daily. Do not crush or chew. 90 tablet 3    linaCLOtide (Linzess) 145 mcg capsule Take 1 capsule (145 mcg) by mouth once daily in the morning. Take before meals. Do not crush or chew. (Patient not taking: Reported on 6/9/2025)      omeprazole (PriLOSEC) 10 mg DR capsule Take 1 capsule (10 mg) by mouth every other day. Do not crush or chew. (Patient not taking: Reported on 6/9/2025)      polyethylene glycol (Glycolax, Miralax) 17 gram packet Take 17 g by mouth once daily. (Patient not taking: Reported on 6/9/2025) 30 packet 0     No current facility-administered medications for this visit.

## 2025-06-16 ENCOUNTER — APPOINTMENT (OUTPATIENT)
Dept: PRIMARY CARE | Facility: CLINIC | Age: 65
End: 2025-06-16
Payer: COMMERCIAL

## 2025-06-19 ENCOUNTER — HOSPITAL ENCOUNTER (OUTPATIENT)
Dept: GASTROENTEROLOGY | Facility: HOSPITAL | Age: 65
Discharge: HOME | End: 2025-06-19
Payer: COMMERCIAL

## 2025-06-19 ENCOUNTER — ANESTHESIA EVENT (OUTPATIENT)
Dept: GASTROENTEROLOGY | Facility: HOSPITAL | Age: 65
End: 2025-06-19
Payer: COMMERCIAL

## 2025-06-19 ENCOUNTER — ANESTHESIA (OUTPATIENT)
Dept: GASTROENTEROLOGY | Facility: HOSPITAL | Age: 65
End: 2025-06-19
Payer: COMMERCIAL

## 2025-06-19 VITALS
TEMPERATURE: 98.6 F | RESPIRATION RATE: 18 BRPM | DIASTOLIC BLOOD PRESSURE: 82 MMHG | BODY MASS INDEX: 25.9 KG/M2 | WEIGHT: 185 LBS | SYSTOLIC BLOOD PRESSURE: 129 MMHG | HEART RATE: 59 BPM | OXYGEN SATURATION: 99 % | HEIGHT: 71 IN

## 2025-06-19 DIAGNOSIS — K59.00 CONSTIPATION, UNSPECIFIED CONSTIPATION TYPE: Primary | ICD-10-CM

## 2025-06-19 DIAGNOSIS — K22.70 BARRETT'S ESOPHAGUS WITHOUT DYSPLASIA: ICD-10-CM

## 2025-06-19 DIAGNOSIS — Z86.0100 HISTORY OF COLON POLYPS: ICD-10-CM

## 2025-06-19 DIAGNOSIS — D49.0 IPMN (INTRADUCTAL PAPILLARY MUCINOUS NEOPLASM): ICD-10-CM

## 2025-06-19 PROCEDURE — 2500000004 HC RX 250 GENERAL PHARMACY W/ HCPCS (ALT 636 FOR OP/ED): Performed by: ANESTHESIOLOGIST ASSISTANT

## 2025-06-19 PROCEDURE — 45380 COLONOSCOPY AND BIOPSY: CPT | Performed by: INTERNAL MEDICINE

## 2025-06-19 PROCEDURE — 7100000009 HC PHASE TWO TIME - INITIAL BASE CHARGE

## 2025-06-19 PROCEDURE — 7100000010 HC PHASE TWO TIME - EACH INCREMENTAL 1 MINUTE

## 2025-06-19 PROCEDURE — A45380 PR COLONOSCOPY,BIOPSY: Performed by: STUDENT IN AN ORGANIZED HEALTH CARE EDUCATION/TRAINING PROGRAM

## 2025-06-19 PROCEDURE — 43239 EGD BIOPSY SINGLE/MULTIPLE: CPT | Performed by: INTERNAL MEDICINE

## 2025-06-19 PROCEDURE — 3700000001 HC GENERAL ANESTHESIA TIME - INITIAL BASE CHARGE

## 2025-06-19 PROCEDURE — 2500000005 HC RX 250 GENERAL PHARMACY W/O HCPCS: Performed by: ANESTHESIOLOGIST ASSISTANT

## 2025-06-19 PROCEDURE — 3700000002 HC GENERAL ANESTHESIA TIME - EACH INCREMENTAL 1 MINUTE

## 2025-06-19 PROCEDURE — A45380 PR COLONOSCOPY,BIOPSY: Performed by: ANESTHESIOLOGIST ASSISTANT

## 2025-06-19 RX ORDER — FENTANYL CITRATE 50 UG/ML
INJECTION, SOLUTION INTRAMUSCULAR; INTRAVENOUS AS NEEDED
Status: DISCONTINUED | OUTPATIENT
Start: 2025-06-19 | End: 2025-06-19

## 2025-06-19 RX ORDER — PROPOFOL 10 MG/ML
INJECTION, EMULSION INTRAVENOUS AS NEEDED
Status: DISCONTINUED | OUTPATIENT
Start: 2025-06-19 | End: 2025-06-19

## 2025-06-19 RX ORDER — LIDOCAINE HYDROCHLORIDE 20 MG/ML
INJECTION, SOLUTION INFILTRATION; PERINEURAL AS NEEDED
Status: DISCONTINUED | OUTPATIENT
Start: 2025-06-19 | End: 2025-06-19

## 2025-06-19 RX ORDER — ONDANSETRON HYDROCHLORIDE 2 MG/ML
4 INJECTION, SOLUTION INTRAVENOUS ONCE AS NEEDED
Status: DISCONTINUED | OUTPATIENT
Start: 2025-06-19 | End: 2025-06-20 | Stop reason: HOSPADM

## 2025-06-19 RX ORDER — ESOMEPRAZOLE MAGNESIUM 10 MG/1
10 GRANULE, DELAYED RELEASE ORAL
COMMUNITY

## 2025-06-19 RX ORDER — DROPERIDOL 2.5 MG/ML
0.62 INJECTION, SOLUTION INTRAMUSCULAR; INTRAVENOUS ONCE AS NEEDED
Status: DISCONTINUED | OUTPATIENT
Start: 2025-06-19 | End: 2025-06-20 | Stop reason: HOSPADM

## 2025-06-19 RX ADMIN — LIDOCAINE HYDROCHLORIDE 100 MG: 20 INJECTION, SOLUTION INFILTRATION; PERINEURAL at 14:04

## 2025-06-19 RX ADMIN — PROPOFOL 70 MG: 10 INJECTION, EMULSION INTRAVENOUS at 14:04

## 2025-06-19 RX ADMIN — PROPOFOL 30 MG: 10 INJECTION, EMULSION INTRAVENOUS at 14:07

## 2025-06-19 RX ADMIN — FENTANYL CITRATE 50 MCG: 50 INJECTION, SOLUTION INTRAMUSCULAR; INTRAVENOUS at 14:07

## 2025-06-19 RX ADMIN — SODIUM CHLORIDE, POTASSIUM CHLORIDE, SODIUM LACTATE AND CALCIUM CHLORIDE: 600; 310; 30; 20 INJECTION, SOLUTION INTRAVENOUS at 14:03

## 2025-06-19 RX ADMIN — PROPOFOL 250 MCG/KG/MIN: 10 INJECTION, EMULSION INTRAVENOUS at 14:05

## 2025-06-19 RX ADMIN — Medication 20 MG: at 14:05

## 2025-06-19 RX ADMIN — FENTANYL CITRATE 50 MCG: 50 INJECTION, SOLUTION INTRAMUSCULAR; INTRAVENOUS at 14:04

## 2025-06-19 ASSESSMENT — PAIN SCALES - GENERAL
PAIN_LEVEL: 6
PAINLEVEL_OUTOF10: 5 - MODERATE PAIN
PAINLEVEL_OUTOF10: 6
PAINLEVEL_OUTOF10: 6

## 2025-06-19 ASSESSMENT — PAIN DESCRIPTION - DESCRIPTORS: DESCRIPTORS: DULL

## 2025-06-19 ASSESSMENT — PAIN - FUNCTIONAL ASSESSMENT
PAIN_FUNCTIONAL_ASSESSMENT: 0-10

## 2025-06-19 NOTE — ANESTHESIA POSTPROCEDURE EVALUATION
Patient: Jenaro Belcher    Procedure Summary       Date: 06/19/25 Room / Location: [facility]    Anesthesia Start: 1403 Anesthesia Stop: 1453    Procedures:       COLONOSCOPY      EGD Diagnosis:       Constipation, unspecified constipation type      History of colon polyps      Pickett's esophagus without dysplasia      IPMN (intraductal papillary mucinous neoplasm)    Scheduled Providers: Mago Palomo MD Responsible Provider: Hi Maria MD    Anesthesia Type: MAC ASA Status: 3            Anesthesia Type: MAC    Vitals Value Taken Time   /67 06/19/25 14:53   Temp 37.0 06/19/25 14:53   Pulse 64 06/19/25 14:53   Resp 16 06/19/25 14:53   SpO2 96 06/19/25 14:53       Anesthesia Post Evaluation    Patient location during evaluation: PACU  Patient participation: complete - patient participated  Level of consciousness: awake  Pain score: 6  Pain management: inadequate  Airway patency: patent  Cardiovascular status: acceptable  Respiratory status: acceptable  Hydration status: acceptable  Postoperative Nausea and Vomiting: none        No notable events documented.

## 2025-06-19 NOTE — DISCHARGE INSTRUCTIONS

## 2025-06-19 NOTE — ANESTHESIA PREPROCEDURE EVALUATION
Patient: Jenaro Belcher    Procedure Information       Date/Time: 06/19/25 1230    Scheduled providers: Mago Palomo MD    Procedures:       COLONOSCOPY      EGD    Location: [facility]            Relevant Problems   Cardiac   (+) Hypertension      Neuro   (+) Carpal tunnel syndrome on right   (+) Left carpal tunnel syndrome   (+) Lumbago with sciatica   (+) Lumbosacral radiculopathy at L5   (+) Meralgia paresthetica   (+) Right cervical radiculopathy   (+) Right lumbar radiculopathy      GI   (+) Gastroesophageal reflux disease   (+) Hiatal hernia      Liver   (+) Liver disease      Hematology   (+) Anemia      Musculoskeletal   (+) Carpal tunnel syndrome on right   (+) Left carpal tunnel syndrome   (+) Spinal stenosis, multilevel      HEENT   (+) Seasonal allergies      ID   (+) Acute bacterial bronchitis      Skin   (+) Drug-induced photosensitivity       Clinical information reviewed:   Tobacco  Allergies  Meds   Med Hx  Surg Hx   Fam Hx  Soc Hx        NPO Detail:  NPO/Void Status  Date of Last Liquid: 06/19/25  Time of Last Liquid: 0600  Date of Last Solid: 06/17/25  Time of Last Solid: 1900  Last Intake Type: Clear fluids; GI prep  Time of Last Void: 1140         Physical Exam    Airway  Mallampati: II  TM distance: >3 FB  Neck ROM: full  Mouth opening: 3 or more finger widths     Cardiovascular   Rhythm: regular  Rate: normal     Dental - normal exam     Pulmonary Breath sounds clear to auscultation     Abdominal            Anesthesia Plan    History of general anesthesia?: yes  History of complications of general anesthesia?: no    ASA 3     MAC     intravenous induction   Anesthetic plan and risks discussed with patient.    Plan discussed with CAA.

## 2025-07-16 ASSESSMENT — ENCOUNTER SYMPTOMS
NAUSEA: 0
DIFFICULTY URINATING: 0
DIZZINESS: 0
AGITATION: 0
FEVER: 0
SHORTNESS OF BREATH: 0
DIARRHEA: 0
DYSURIA: 0
COLOR CHANGE: 0
VOMITING: 0
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
## 2025-07-17 ENCOUNTER — ALLIED HEALTH (OUTPATIENT)
Dept: INTEGRATIVE MEDICINE | Facility: CLINIC | Age: 65
End: 2025-07-17
Payer: COMMERCIAL

## 2025-07-17 DIAGNOSIS — M99.03 SOMATIC DYSFUNCTION OF LUMBAR REGION: Primary | ICD-10-CM

## 2025-07-17 DIAGNOSIS — M79.10 MYALGIA, UNSPECIFIED SITE: ICD-10-CM

## 2025-07-17 DIAGNOSIS — M54.2 CERVICALGIA: ICD-10-CM

## 2025-07-17 DIAGNOSIS — M99.01 SOMATIC DYSFUNCTION OF CERVICAL REGION: ICD-10-CM

## 2025-07-17 DIAGNOSIS — M99.04 SACROILIAC JOINT SOMATIC DYSFUNCTION: ICD-10-CM

## 2025-07-17 DIAGNOSIS — M54.17 LUMBOSACRAL RADICULOPATHY AT L5: ICD-10-CM

## 2025-07-17 DIAGNOSIS — M99.02 SOMATIC DYSFUNCTION OF THORACIC REGION: ICD-10-CM

## 2025-07-17 PROCEDURE — 98941 CHIROPRACT MANJ 3-4 REGIONS: CPT | Performed by: CHIROPRACTOR

## 2025-07-17 PROCEDURE — 97112 NEUROMUSCULAR REEDUCATION: CPT | Performed by: CHIROPRACTOR

## 2025-08-26 ENCOUNTER — APPOINTMENT (OUTPATIENT)
Dept: GASTROENTEROLOGY | Facility: CLINIC | Age: 65
End: 2025-08-26
Payer: COMMERCIAL